# Patient Record
Sex: FEMALE | Race: WHITE | NOT HISPANIC OR LATINO | URBAN - METROPOLITAN AREA
[De-identification: names, ages, dates, MRNs, and addresses within clinical notes are randomized per-mention and may not be internally consistent; named-entity substitution may affect disease eponyms.]

---

## 2017-01-18 ENCOUNTER — TELEPHONE (OUTPATIENT)
Dept: TRANSPLANT | Facility: CLINIC | Age: 27
End: 2017-01-18

## 2017-01-18 NOTE — TELEPHONE ENCOUNTER
Vanessa was concerned with the info in packet about needing a PAP. She has never been and is not now sexually active. Mother had Cervical CA 20+ yrs ago but no other family hx. States if we tell her she needs a Pap she will withdraw from testing. Now presented at donor meeting and it was decided she was OK to not have one.Now spoke again with Vanessa and was very relieved. Will now do Phase 1 tests.

## 2017-01-19 ENCOUNTER — DOCUMENTATION ONLY (OUTPATIENT)
Dept: TRANSPLANT | Facility: CLINIC | Age: 27
End: 2017-01-19

## 2017-01-19 NOTE — PROGRESS NOTES
Per Vanessa's request, Phase 1 orders were faxed to [facility] Fax #[phone]

## 2017-02-06 ENCOUNTER — DOCUMENTATION ONLY (OUTPATIENT)
Dept: TRANSPLANT | Facility: CLINIC | Age: 27
End: 2017-02-06

## 2017-02-06 ENCOUNTER — TELEPHONE (OUTPATIENT)
Dept: TRANSPLANT | Facility: CLINIC | Age: 27
End: 2017-02-06

## 2017-02-07 ENCOUNTER — RESULTS ONLY (OUTPATIENT)
Dept: OTHER | Facility: CLINIC | Age: 27
End: 2017-02-07

## 2017-02-15 LAB
A* LOCUS: NORMAL
A*: NORMAL
ABTEST METHOD: NORMAL
B* LOCUS: NORMAL
B*: NORMAL
BW-1: NORMAL
BW-2: NORMAL
C* LOCUS: NORMAL
C*: NORMAL
DPA1*: NORMAL
DPB1*: NORMAL
DQA1*: NORMAL
DQA1*LOCUS: NORMAL
DQB1* LOCUS: NORMAL
DQB1*: NORMAL
DRB1* LOCUS: NORMAL
DRB3* LOCUS: NORMAL
DRB3*: NORMAL
DRSSO TEST METHOD: NORMAL

## 2017-02-27 ENCOUNTER — TELEPHONE (OUTPATIENT)
Dept: TRANSPLANT | Facility: CLINIC | Age: 27
End: 2017-02-27

## 2017-02-27 NOTE — TELEPHONE ENCOUNTER
Informed Vanessa of Virtual XM results-DSA. Cannot be direct donor. Multiple ?'s. Will now think over PEP and will get back to us.

## 2017-04-07 ENCOUNTER — TELEPHONE (OUTPATIENT)
Dept: TRANSPLANT | Facility: CLINIC | Age: 27
End: 2017-04-07

## 2017-04-07 DIAGNOSIS — Z00.5 TRANSPLANT DONOR EVALUATION: ICD-10-CM

## 2017-04-07 NOTE — TELEPHONE ENCOUNTER
Has decided she wants to do PEP. Is abo O. 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 Not happy that she needs to be here early in morning.Has CD. [place] not left [place] in past 3 months.

## 2017-05-05 ENCOUNTER — INFUSION THERAPY VISIT (OUTPATIENT)
Dept: INFUSION THERAPY | Facility: CLINIC | Age: 27
End: 2017-05-05
Attending: INTERNAL MEDICINE

## 2017-05-05 ENCOUNTER — OFFICE VISIT (OUTPATIENT)
Dept: NEPHROLOGY | Facility: CLINIC | Age: 27
End: 2017-05-05
Attending: TRANSPLANT SURGERY

## 2017-05-05 ENCOUNTER — OFFICE VISIT (OUTPATIENT)
Dept: TRANSPLANT | Facility: CLINIC | Age: 27
End: 2017-05-05
Attending: TRANSPLANT SURGERY

## 2017-05-05 ENCOUNTER — ALLIED HEALTH/NURSE VISIT (OUTPATIENT)
Dept: TRANSPLANT | Facility: CLINIC | Age: 27
End: 2017-05-05
Attending: TRANSPLANT SURGERY

## 2017-05-05 VITALS
BODY MASS INDEX: 25.91 KG/M2 | OXYGEN SATURATION: 99 % | WEIGHT: 165.1 LBS | TEMPERATURE: 98.3 F | DIASTOLIC BLOOD PRESSURE: 74 MMHG | HEIGHT: 67 IN | SYSTOLIC BLOOD PRESSURE: 118 MMHG | HEART RATE: 100 BPM | RESPIRATION RATE: 16 BRPM

## 2017-05-05 VITALS — DIASTOLIC BLOOD PRESSURE: 72 MMHG | SYSTOLIC BLOOD PRESSURE: 108 MMHG

## 2017-05-05 VITALS — DIASTOLIC BLOOD PRESSURE: 76 MMHG | SYSTOLIC BLOOD PRESSURE: 108 MMHG

## 2017-05-05 DIAGNOSIS — Z00.5 EXAMINATION OF POTENTIAL DONOR OF ORGAN AND TISSUE: Primary | ICD-10-CM

## 2017-05-05 DIAGNOSIS — Z00.5 TRANSPLANT DONOR EVALUATION: ICD-10-CM

## 2017-05-05 DIAGNOSIS — Z00.5 TRANSPLANT DONOR EVALUATION: Primary | ICD-10-CM

## 2017-05-05 LAB
ABO + RH BLD: NORMAL
ALBUMIN SERPL-MCNC: 4 G/DL (ref 3.4–5)
ALBUMIN UR-MCNC: NEGATIVE MG/DL
ALP SERPL-CCNC: 51 U/L (ref 40–150)
ALT SERPL W P-5'-P-CCNC: 20 U/L (ref 0–50)
ANION GAP SERPL CALCULATED.3IONS-SCNC: 8 MMOL/L (ref 3–14)
APPEARANCE UR: CLEAR
APTT PPP: 30 SEC (ref 22–37)
AST SERPL W P-5'-P-CCNC: 19 U/L (ref 0–45)
B-HCG SERPL-ACNC: <1 IU/L (ref 0–5)
BACTERIA #/AREA URNS HPF: ABNORMAL /HPF
BILIRUB SERPL-MCNC: 0.6 MG/DL (ref 0.2–1.3)
BILIRUB UR QL STRIP: NEGATIVE
BLD GP AB SCN SERPL QL: NORMAL
BLOOD BANK CMNT PATIENT-IMP: NORMAL
BUN SERPL-MCNC: 8 MG/DL (ref 7–30)
CALCIUM SERPL-MCNC: 8.4 MG/DL (ref 8.5–10.1)
CHLORIDE SERPL-SCNC: 106 MMOL/L (ref 94–109)
CHOLEST SERPL-MCNC: 164 MG/DL
CMV IGG SERPL QL IA: 0.3 AI (ref 0–0.8)
CO2 SERPL-SCNC: 26 MMOL/L (ref 20–32)
COLOR UR AUTO: ABNORMAL
CREAT SERPL-MCNC: 0.63 MG/DL (ref 0.52–1.04)
CREAT UR-MCNC: 24 MG/DL
EBV VCA IGG SER QL IA: NORMAL AI (ref 0–0.8)
EBV VCA IGM SER QL IA: 0.2 AI (ref 0–0.8)
ERYTHROCYTE [DISTWIDTH] IN BLOOD BY AUTOMATED COUNT: 11.2 % (ref 10–15)
GFR SERPL CREATININE-BSD FRML MDRD: ABNORMAL ML/MIN/1.7M2
GLUCOSE SERPL-MCNC: 83 MG/DL (ref 70–99)
GLUCOSE UR STRIP-MCNC: NEGATIVE MG/DL
HBA1C MFR BLD: 5.1 % (ref 4.3–6)
HBV CORE AB SERPL QL IA: NONREACTIVE
HBV SURFACE AB SERPL IA-ACNC: 273.65 M[IU]/ML
HBV SURFACE AG SERPL QL IA: NONREACTIVE
HCT VFR BLD AUTO: 41 % (ref 35–47)
HCV AB SERPL QL IA: NORMAL
HDLC SERPL-MCNC: 82 MG/DL
HGB BLD-MCNC: 14.1 G/DL (ref 11.7–15.7)
HGB UR QL STRIP: NEGATIVE
HIV 1+2 AB+HIV1 P24 AG SERPL QL IA: NORMAL
INR PPP: 1.04 (ref 0.86–1.14)
KETONES UR STRIP-MCNC: NEGATIVE MG/DL
LDLC SERPL CALC-MCNC: 76 MG/DL
LEUKOCYTE ESTERASE UR QL STRIP: NEGATIVE
MCH RBC QN AUTO: 30.9 PG (ref 26.5–33)
MCHC RBC AUTO-ENTMCNC: 34.4 G/DL (ref 31.5–36.5)
MCV RBC AUTO: 90 FL (ref 78–100)
MICROALBUMIN UR-MCNC: <5 MG/L
MICROALBUMIN/CREAT UR: NORMAL MG/G CR (ref 0–25)
NITRATE UR QL: NEGATIVE
NONHDLC SERPL-MCNC: 82 MG/DL
PH UR STRIP: 7 PH (ref 5–7)
PHOSPHATE SERPL-MCNC: 3 MG/DL (ref 2.5–4.5)
PLATELET # BLD AUTO: 250 10E9/L (ref 150–450)
POTASSIUM SERPL-SCNC: 4 MMOL/L (ref 3.4–5.3)
PROT SERPL-MCNC: 7.8 G/DL (ref 6.8–8.8)
PROT UR-MCNC: <0.05 G/L
PROT/CREAT 24H UR: NORMAL G/G CR (ref 0–0.2)
RBC # BLD AUTO: 4.56 10E12/L (ref 3.8–5.2)
RBC #/AREA URNS AUTO: 1 /HPF (ref 0–2)
SODIUM SERPL-SCNC: 140 MMOL/L (ref 133–144)
SP GR UR STRIP: 1 (ref 1–1.03)
SPECIMEN EXP DATE BLD: NORMAL
SPECIMEN EXP DATE BLD: NORMAL
SQUAMOUS #/AREA URNS AUTO: <1 /HPF (ref 0–1)
T PALLIDUM IGG+IGM SER QL: NEGATIVE
TRIGL SERPL-MCNC: 31 MG/DL
URATE SERPL-MCNC: 3.1 MG/DL (ref 2.6–6)
URN SPEC COLLECT METH UR: ABNORMAL
UROBILINOGEN UR STRIP-MCNC: 0 MG/DL (ref 0–2)
WBC # BLD AUTO: 4 10E9/L (ref 4–11)
WBC #/AREA URNS AUTO: <1 /HPF (ref 0–2)

## 2017-05-05 PROCEDURE — 85730 THROMBOPLASTIN TIME PARTIAL: CPT | Performed by: INTERNAL MEDICINE

## 2017-05-05 PROCEDURE — 25500064 ZZH RX 255 OP 636: Performed by: INTERNAL MEDICINE

## 2017-05-05 PROCEDURE — 80061 LIPID PANEL: CPT | Performed by: INTERNAL MEDICINE

## 2017-05-05 PROCEDURE — 84702 CHORIONIC GONADOTROPIN TEST: CPT | Performed by: INTERNAL MEDICINE

## 2017-05-05 PROCEDURE — 96374 THER/PROPH/DIAG INJ IV PUSH: CPT

## 2017-05-05 PROCEDURE — 84156 ASSAY OF PROTEIN URINE: CPT | Performed by: INTERNAL MEDICINE

## 2017-05-05 PROCEDURE — 83036 HEMOGLOBIN GLYCOSYLATED A1C: CPT | Performed by: INTERNAL MEDICINE

## 2017-05-05 PROCEDURE — 86704 HEP B CORE ANTIBODY TOTAL: CPT | Performed by: INTERNAL MEDICINE

## 2017-05-05 PROCEDURE — 84100 ASSAY OF PHOSPHORUS: CPT | Performed by: INTERNAL MEDICINE

## 2017-05-05 PROCEDURE — 86803 HEPATITIS C AB TEST: CPT | Performed by: INTERNAL MEDICINE

## 2017-05-05 PROCEDURE — 85027 COMPLETE CBC AUTOMATED: CPT | Performed by: INTERNAL MEDICINE

## 2017-05-05 PROCEDURE — 86901 BLOOD TYPING SEROLOGIC RH(D): CPT | Performed by: INTERNAL MEDICINE

## 2017-05-05 PROCEDURE — 86665 EPSTEIN-BARR CAPSID VCA: CPT | Performed by: INTERNAL MEDICINE

## 2017-05-05 PROCEDURE — 86706 HEP B SURFACE ANTIBODY: CPT | Performed by: INTERNAL MEDICINE

## 2017-05-05 PROCEDURE — 86665 EPSTEIN-BARR CAPSID VCA: CPT | Mod: 91 | Performed by: INTERNAL MEDICINE

## 2017-05-05 PROCEDURE — 40000866 ZZHCL STATISTIC HIV 1/2 ANTIGEN/ANTIBODY PRETRANSPLANT ONLY: Performed by: INTERNAL MEDICINE

## 2017-05-05 PROCEDURE — 86850 RBC ANTIBODY SCREEN: CPT | Performed by: INTERNAL MEDICINE

## 2017-05-05 PROCEDURE — 84550 ASSAY OF BLOOD/URIC ACID: CPT | Performed by: INTERNAL MEDICINE

## 2017-05-05 PROCEDURE — 86480 TB TEST CELL IMMUN MEASURE: CPT | Performed by: INTERNAL MEDICINE

## 2017-05-05 PROCEDURE — 82542 COL CHROMOTOGRAPHY QUAL/QUAN: CPT | Performed by: INTERNAL MEDICINE

## 2017-05-05 PROCEDURE — 82043 UR ALBUMIN QUANTITATIVE: CPT | Performed by: INTERNAL MEDICINE

## 2017-05-05 PROCEDURE — 86900 BLOOD TYPING SEROLOGIC ABO: CPT | Performed by: INTERNAL MEDICINE

## 2017-05-05 PROCEDURE — 86644 CMV ANTIBODY: CPT | Performed by: INTERNAL MEDICINE

## 2017-05-05 PROCEDURE — 87340 HEPATITIS B SURFACE AG IA: CPT | Performed by: INTERNAL MEDICINE

## 2017-05-05 PROCEDURE — 86780 TREPONEMA PALLIDUM: CPT | Performed by: INTERNAL MEDICINE

## 2017-05-05 PROCEDURE — 36415 COLL VENOUS BLD VENIPUNCTURE: CPT | Performed by: INTERNAL MEDICINE

## 2017-05-05 PROCEDURE — 81001 URINALYSIS AUTO W/SCOPE: CPT | Performed by: INTERNAL MEDICINE

## 2017-05-05 PROCEDURE — 85610 PROTHROMBIN TIME: CPT | Performed by: INTERNAL MEDICINE

## 2017-05-05 PROCEDURE — 80053 COMPREHEN METABOLIC PANEL: CPT | Performed by: INTERNAL MEDICINE

## 2017-05-05 RX ADMIN — IOHEXOL 5 ML: 300 INJECTION, SOLUTION INTRAVENOUS at 09:12

## 2017-05-05 ASSESSMENT — PAIN SCALES - GENERAL: PAINLEVEL: NO PAIN (0)

## 2017-05-05 NOTE — PROGRESS NOTES
Donor Iohexol test    Vanessa Larsen presents today to [facility] for a Donor Iohexol test.      Progress note:  ID verified by name and .     The following information was verified with the patient:  Female Patients is there any possibility of being pregnant No  Is there a history of allergy (skin rash, swelling, ect) to:   A.  Iodine (except skin reactions to betadine): No   B. Intravenous radio-contrast agents: No   C. Seafood No    R.N. provided patient with educational handout regarding timed test. No    20 gauge PIV placed in R AC.  Iohexol administered.  Following iohexol administration extension set replaced.  PIV left in place for blood draws and CT this afternoon    Medication administered:  Iohexol (Omnipaque 300mg iodine/ml concentration) 5 mls.    Start time: 912  Stop time: 914    Evaluation nurse in transplant to draw labs at 2 and 4 hours post iohexol administration.  Patient given a slip with the times to get labs drawn and verbalized understanding of the plan.    Patient tolerated the procedure:  Yes    After the infusion patient was discharged to the next appointment.

## 2017-05-05 NOTE — LETTER
5/5/2017       RE: Vanessa Larsen  [address] [address] [address]     Dear Colleague,    Thank you for referring your patient, Vanessa Larsen, to the [facility] SOLID ORGAN TRANSPLANT at [facility]. Please see a copy of my visit note below.    Patient attended all appointments and completed all scheduled tests.  Patient to follow up with Transplant Coordinator.  Patient stated understanding and has contact numbers.      Again, thank you for allowing me to participate in the care of your patient.      Sincerely,    Transplant Evaluation Resource

## 2017-05-05 NOTE — MR AVS SNAPSHOT
After Visit Summary   5/5/2017    Vanessa Larsen    MRN: [id number]           Patient Information     Date Of Birth          1990        Visit Information        Provider Department      5/5/2017 1:00 PM Mayra Najera MD [facility] Nephrology        Today's Diagnoses     Transplant donor evaluation           Follow-ups after your visit        Who to contact     If you have questions or need follow up information about today's clinic visit or your schedule please contact [facility] NEPHROLOGY directly at [phone].  Normal or non-critical lab and imaging results will be communicated to you by SugarCRMhart, letter or phone within 4 business days after the clinic has received the results. If you do not hear from us within 7 days, please contact the clinic through SugarCRMhart or phone. If you have a critical or abnormal lab result, we will notify you by phone as soon as possible.  Submit refill requests through Dizzion or call your pharmacy and they will forward the refill request to us. Please allow 3 business days for your refill to be completed.          Additional Information About Your Visit        MyChart Information     Dizzion gives you secure access to your electronic health record. If you see a primary care provider, you can also send messages to your care team and make appointments. If you have questions, please call your primary care clinic.  If you do not have a primary care provider, please call [phone] and they will assist you.        Care EveryWhere ID     This is your Care EveryWhere ID. This could be used by other organizations to access your [place] medical records  [id number]         Blood Pressure from Last 3 Encounters:   No data found for BP    Weight from Last 3 Encounters:   No data found for Wt              Today, you had the following     No orders found for display       Primary Care Provider Office Phone # Fax #    Edelmira Nance [phone]        [facility] [address] [facility] [address]        Thank you!     Thank you for choosing [facility] NEPHROLOGY  for your care. Our goal is always to provide you with excellent care. Hearing back from our patients is one way we can continue to improve our services. Please take a few minutes to complete the written survey that you may receive in the mail after your visit with us. Thank you!             Your Updated Medication List - Protect others around you: Learn how to safely use, store and throw away your medicines at www.disposemymeds.org.      Notice  As of 5/5/2017 11:59 PM    You have not been prescribed any medications.

## 2017-05-05 NOTE — MR AVS SNAPSHOT
"              After Visit Summary   2017    Vanessa Larsen    MRN: [id number]           Patient Information     Date Of Birth          1990        Visit Information        Provider Department      2017 12:30 PM Mary Gutierrez S.A., RN [facility] Solid Organ Transplant        Today's Diagnoses     Examination of potential donor of organ and tissue    -  1       Follow-ups after your visit        Who to contact     If you have questions or need follow up information about today's clinic visit or your schedule please contact [facility] SOLID ORGAN TRANSPLANT directly at [phone].  Normal or non-critical lab and imaging results will be communicated to you by Printihart, letter or phone within 4 business days after the clinic has received the results. If you do not hear from us within 7 days, please contact the clinic through Printihart or phone. If you have a critical or abnormal lab result, we will notify you by phone as soon as possible.  Submit refill requests through Vivid Logic or call your pharmacy and they will forward the refill request to us. Please allow 3 business days for your refill to be completed.          Additional Information About Your Visit        MyChart Information     Vivid Logic lets you send messages to your doctor, view your test results, renew your prescriptions, schedule appointments and more. To sign up, go to [email or web address] . Click on \"Log in\" on the left side of the screen, which will take you to the Welcome page. Then click on \"Sign up Now\" on the right side of the page.     You will be asked to enter the access code listed below, as well as some personal information. Please follow the directions to create your username and password.     Your access code is: [email or web address]  Expires: 2017  6:31 AM     Your access code will  in 90 days. If you need help or a new code, please call your [place] clinic or [phone].        Care EveryWhere ID     This is your Care EveryWhere " ID. This could be used by other organizations to access your [place] medical records  [id number]         Blood Pressure from Last 3 Encounters:   05/05/17 108/72   05/05/17 108/76   05/05/17 118/74    Weight from Last 3 Encounters:   05/05/17 74.9 kg (165 lb 1.6 oz)              Today, you had the following     No orders found for display       Primary Care Provider Office Phone # Fax #    Edelmira FLOYD Nance [phone]       [place] [address] [address] [address]        Thank you!     Thank you for choosing [facility] SOLID ORGAN TRANSPLANT  for your care. Our goal is always to provide you with excellent care. Hearing back from our patients is one way we can continue to improve our services. Please take a few minutes to complete the written survey that you may receive in the mail after your visit with us. Thank you!             Your Updated Medication List - Protect others around you: Learn how to safely use, store and throw away your medicines at www.disposemymeds.org.      Notice  As of 5/5/2017  3:57 PM    You have not been prescribed any medications.

## 2017-05-05 NOTE — PROGRESS NOTES
Psychosocial Evaluation  Living Organ Donation per OPTN Policy 14.1.A  Organ Type: unrelated, kidney  Presenting Information:  Ms. Vanessa Larsen presents to the [facility] to complete a psychosocial evaluation since she is interested in becoming a kidney donor to her aunt, through marriage, Ms. Julia Helton, age 49.  Ms. Larsen is a 27 year old, single, white, American, female.  She is in clinic today with her mother, but she was evaluated independently.    PERSONAL BACKGROUND:  Current Living Situation: Ms. Larsen lives with her mother in [place], [place] which is a 3 1/2 to 4 hour drive from the [facility].    Education/Employment/Financial Situation: Ms. Larsen completed a bachelor's degree in elementary education.  She is just completing her first year as a  in the [place], [place] school district, which is about a 25 minute drive from her home in [place], [place].  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  This would lessen the financial impact of donation.  She has health care insurance though her job at the school district.  We did discuss some options, including NLDAC, for her to help fund her donor travel expenses.  Currently, she is staying overnight with a brother who lives locally, but that brother is likely moving out of the area soon, so she would need some assistance for hotel if she were approved for surgery.    Family History: Ms. Larsen is single, never , and does not have any children.  She lives with her mother.  Her parents are , and her father lives in town.  She sees him weekly.  She has 4 brother and 1 sister.  The two oldest brother and the 1 older sister are 1/2 siblings, and the 2 younger brothers are full siblings.  She reports a strong family history of alcohol and drug abuse/dependency with her mom  "and some of her siblings.       General Health: Ms. Larsen reports that she is in excellent health.  She has only had one surgery for wisdom tooth extraction.  She exercises only in the nice weather.  She likes to do rollerblading.    Mental Health: Denies any past or present treatment for mental health issues.  Denies any need to see a counselor or therapist.  Denies any past suicidal ideation, plans, or past attempts.  Denies any use of psychotropic medications.  Denies any past history of hospitalization for psychiatric illness.    Alcohol and Drug Use/Abuse/Dependency: Denies any past history of abuse or dependency on alcohol or illicit drugs. Denies any current use of street drugs, including marijuana.  Denies any past history of negative consequences of use of alcohol or drugs such as a DUI.  Ms. Larsen reports that she does not use any substances and never has.  She reports that she feels that she has a rather \"addictive personality\" and can get hooked on things easily, such as video games, so she does not keep video games in her house.  She reports a strong family history of addiction of alcohol and drugs in her family:  Mother and some siblings.  Because of this, she chooses not to use any substances.  Ms. Larsen does have some concerns about pain medications needed post surgery and what can be done to minimize use of opoid medications.  I asked her to speak with the surgeon about this issue.  I then spoke to Dr. Lange ahead of the patient's appointment with her today to let her know of Mr. Larsen's concerns.           Cigarette Use: Ms. Larsen does not smoke cigarettes and never has.    Legal: Ms. Larsen denies any legal issues.    Coping Strategies/Support System: Ms. Larsne's mother, with whom she lives, would be willing and able to care for her post surgery.  Ms. Larsen enjoys gardening and roller-blading for her hobbies.    DONOR SPECIFIC INFORMATION:  Relationship to Recipient: Ms. Larsen wants to " donate a kidney to her aunt through marriage, Ms. Julia Helton, age 49.  Ms. Helton is her mother's sister-in-law.  They are quite close to her, even through she is  from Ms. Larsen's uncle, who is the biological brother of Ms. Larsen's mom.  Ms. Larsen does not know the details of Ms. Helton's disease, but does tell me that this will be her first transplant.      Decision Process/Motivation to Donate: Ms. Larsen states that she is motivated to help her aunt out of concern for her and her desire to see her live a long, healthier life.  She states that her aunt is a terrific person who has raised 3 biological children and 2 adopted children.  Ms. Larsen is close to these cousins.  Ms. Larsen denies any pressure or coercion to donate.  She denies any offer of payment to donate.    PREPARATION FOR DONATION, RECOVERY, AND POTENTIAL SHORT-LONG-TERM OUTCOMES:  Understanding of the Living Donation Process:   We discussed the role of the donor  and Independent Donor Advocate.  Short and long term medical and psychosocial risks to both, donor and recipient were reviewed and she appears to understand these risks.  High risk behaviors as defined by [place] Public Health Services (PHS) 2013 that have potential to increase risk of disease transmission were reviewed and she denies any high risk behaviors. Post surgical restrictions (2 weeks no driving, 6 weeks no lifting over 10 lbs) were reviewed and she appears capable of adhering to the post surgical requirements. The need for a caregiver was discussed and she had adequate social support .  The risk of poor psychosocial outcome including problems with body image, post-surgery depression or anxiety, or feelings of emotional distress or bereavement if recipient experiences any recurrent disease, poor outcome or death was reviewed.  Additionally, potential financial implications, including the risk of having difficulty obtaining health care insurance,  "life insurance, disability insurance, or long term care insurance were reviewed, as were available donor grants to assist with donor related expenses.      We also discussed some unique issues that arise with paired kidney donation, which include the uncertainty of the timing and the importance of having a employment situation and support system that is able to provide sustained support and flexibility.    Ms. Larsen appears capable of understanding this information and making an informed medical decision.    Impressions/Recommendations:   Ms. Vanessa Larsen  is highly motivated to donate kidney  to Ms. Julia Helton, age 49, who is her aunt though marriage (her mother's sister-in-law).  Her decision to donate is free of inducement, coercion, or other undue pressure.   Her housing, finances and employment are stable.  No current/active mental health or chemical abuse issues were identified.  However, we did discuss the history of illicit drug and alcohol abuse in her biological family members, and her concerns about her own potential for addiction.  She will need proper counseling from the surgeon to educate about options for post surgical pain control to avoid or curtail potentially addictive substances.  To be clear, Ms. Larsen does not use any substances (alcohol or illicit drugs) and never has.  She is looking out for herself due to her family's addictive behaviors and her own personality traits to be somewhat \"addictive/obsessive\".  The need for a caregiver was reviewed and she is able to identify a plan to meet her post operative care needs.  Ms. Larsen appears capable of making an informed medical decision.  No psychosocial contraindications to living organ donation were identified and  I support Ms. Larsen s desire to donate a kidney to Ms. Julia Sunitha.       Contact Information:   BARBER Espino, [facility]  Clinical  and Independent Donor Advocate  [facility] " - Transplant Center  Pager:  [phone]  Direct:  [phone]    Time Spent: 60 minutes      Living Kidney Donor Consent per OPTN Policy 14.3.A for Independent Living Donor Advocate (MICKI)    Written assurance has been obtained from the potential donor that he/she:   Is willing to donate  Is free from inducement and coercion  Has been informed that the he/she may decline to donate at any time  Has been informed that transplant centers must:   A) Offer donors an opportunity to discontinue the donor consent or evaluation process in a way that is protected and confidential  B) Provide an independent living donor advocate (MICKI) to assist the potential donor during this process    The following was presented to the potential donor in a language in which the potential donor is able to engage in meaningful dialogue:   Education and instruction about all phases of the living donation process including:   Consent  Medical and psychosocial evaluations  Pre and post operative care  Required post operative follow up  Disclosure that the [facility] hospital will take all reasonable precautions to provide confidentiality for the donor/recipient  Disclosure that it is a federal crime for any person to knowingly acquire, obtain or otherwise transfer any human organ for valuable consideration  Disclosure that the [facility] hospital must provide an independent living donor advocate (MICKI)  Disclosure that health information obtained during the evaluation is subject to the same regulations as all records and could reveal conditions that must be reported to local, state, or federal public health authorities  Disclosure that the [facility] hospital is required to report living donor follow up information at 6 months, 1 year, and 2 years, and that the potential donor must commit to post operative follow up testing coordinated by the [facility] hospital    Disclosure has been provided that these risks may be transient or permanent & include  but are not limited to:  Potential psychosocial risks:  Problems with body image  Post-surgery depression or anxiety  Feelings of emotional distress or bereavement if recipient experiences any recurrent disease or in the event of the recipient s death  Impact of donation on the donor s lifestyle    Potential financial impacts:  Personal expenses of travel, housing, , lost wages related to donation might not be reimbursed. However, resources may be available to defray some donation-related expenses   Need for life-long follow up at the donor s expense  Loss of employment or income  Negative impact on the ability to obtain future employment  Negative impact on the ability to obtain, maintain, or afford health, disability, and life insurance  Future health problems experienced by living donors following donation may not be covered by the recipient s insurance    Contact Information:  BARBER Espino, [facility]  Clinical  and Independent Donor Advocate  [facility] - Transplant Center  Pager:  [phone]  Direct:  [phone]      Time Spent: 60 minutes

## 2017-05-05 NOTE — MR AVS SNAPSHOT
"              After Visit Summary   2017    Vanessa Larsen    MRN: [id number]           Patient Information     Date Of Birth          1990        Visit Information        Provider Department      2017 8:00 AM [facility] EVALUATION [facility] Solid Organ Transplant        Today's Diagnoses     Transplant donor evaluation    -  1       Follow-ups after your visit        Who to contact     If you have questions or need follow up information about today's clinic visit or your schedule please contact [facility] SOLID ORGAN TRANSPLANT directly at [phone].  Normal or non-critical lab and imaging results will be communicated to you by MyChart, letter or phone within 4 business days after the clinic has received the results. If you do not hear from us within 7 days, please contact the clinic through LivingWell Healthhart or phone. If you have a critical or abnormal lab result, we will notify you by phone as soon as possible.  Submit refill requests through Bozuko or call your pharmacy and they will forward the refill request to us. Please allow 3 business days for your refill to be completed.          Additional Information About Your Visit        MyChart Information     Bozuko lets you send messages to your doctor, view your test results, renew your prescriptions, schedule appointments and more. To sign up, go to [email or web address] . Click on \"Log in\" on the left side of the screen, which will take you to the Welcome page. Then click on \"Sign up Now\" on the right side of the page.     You will be asked to enter the access code listed below, as well as some personal information. Please follow the directions to create your username and password.     Your access code is: [email or web address]  Expires: 2017  6:31 AM     Your access code will  in 90 days. If you need help or a new code, please call your [place] clinic or [phone].        Care EveryWhere ID     This is your Care EveryWhere ID. This could be used by " "other organizations to access your [place] medical records  [id number]        Your Vitals Were     Pulse Temperature Respirations Height Pulse Oximetry BMI (Body Mass Index)    100 98.3  F (36.8  C) (Oral) 16 1.702 m (5' 7\") 99% 25.86 kg/m2       Blood Pressure from Last 3 Encounters:   05/05/17 108/72   05/05/17 108/76   05/05/17 118/74    Weight from Last 3 Encounters:   05/05/17 74.9 kg (165 lb 1.6 oz)              Today, you had the following     No orders found for display       Primary Care Provider Office Phone # Fax #    Edelmira FLOYD Nance [phone]       [address] [address]        Thank you!     Thank you for choosing [facility] SOLID ORGAN TRANSPLANT  for your care. Our goal is always to provide you with excellent care. Hearing back from our patients is one way we can continue to improve our services. Please take a few minutes to complete the written survey that you may receive in the mail after your visit with us. Thank you!             Your Updated Medication List - Protect others around you: Learn how to safely use, store and throw away your medicines at www.disposemymeds.org.      Notice  As of 5/5/2017 11:59 PM    You have not been prescribed any medications.      "

## 2017-05-05 NOTE — PROGRESS NOTES
Saw Vanessa Larsen in clinic during kidney donor evaluation.  Has offered to be donor to Paired exchange program for Julia Helton.    They are incompatible by DSA.      Discussed surgery hospitalization and recovery.  Knows when and how to obtain evaluation results and the process for scheduling surgery.  Has received and reviewed the donor education materials including donor DVD.  Signed consent for donor evaluation.  Reviewed SRTR Datasheet.  Requested routine cancer screening tests: n/a.  The patient is not sexually active and has not required pap testing.    Time spent 20 minutes.    The patient lives in [place], she is a teacher.  Her mother Maria A came with her today.  Timing for surgery is preferred in summer.      I reviewed details pertaining to the Paired Exchange programs.       1. National Kidney Registry    2. [place] for Paired donation    3. [facility] D Program    4. Internal Exchange at the [facility]    Matching Procedure and Logistics    Reviewed that donors and candidates do not choose their match and that they may decline a match. Explained examples of potential chain/swap scenarios and demonstrated how more than one candidate can receive a transplant in these exchanges.   Reviewed that the KPD waiting time is unknown, the intended recipient's antibody panel and reviewed their ABO match power.   Frequent lab draws are necessary in the KPD programs. APD and NKR will send a kit when we initiate the listing to store the blood by freezing the sample and use it for exploratory crossmatches. Once a match offer is made, a fresh blood sample will be needed for the final confirmatory compatibility testing, as well as infectious disease testing.  I reviewed billing policy for the donor evaluation and for the KPD program.  Bridging  KPD programs may need to have the donor wait a period of time after the recipient receives their kidney, in order to determine matching and logistics  for the next cluster of a chain. If donor consents to be a bridge donor, the time waiting for the swap cluster to take place is unknown. The donor stated that she would like to consider her decision about being a bridge donor if needed. Further blood work may be needed if they are waiting to bridge one year after the initial evaluation.  Unexpected Outcome  I discussed possibility of an unexpected event on the day of transplant. I explained how their donated kidney would be backed up here locally as well as in the destination city in the event that the matched recipient is unable to receive the donated kidney.   I reviewed the KPD programs remedy for failed KPD exchanges, and the remedy does not include any additional priority for the paired candidate on the  donor waiting list. The outcome of the matched recipient may be different from the intended recipient's outcome.  Shipping  The kidney's are transported to the matched recipient's in the exchanges via an approved  service to the airport and then flown fixed wing to the intended destination.GPS devices are used in all the NKR exchanges for close monitoring. Risk included in shipping include damage or loss related to unforseen situations; car crash, airplane crash, terrorist events, etc.   Communication  Donors can communicate with the recipient by giving all correspondence to the Transplant Coordinator, omitting all personal identifiers. The Transplant Coordinator will review and deliver to the recipients Coordinator.  Rights  Donors have a right to withdraw from participation in the KPD program at any time.     Donor has signed consent for:  *Release of Information form used for sharing evaluation clinical data to recipient transplant center.  She has taken the main KPD consent forms home with her to read and she will sign and send back when completed.     Questions answered.      Living Kidney Donor Consent per OPTN Policy for Transplant  Coordinators     Written assurance has been obtained from the patient that the donor:   1) Is willing to donate  2) Is free from inducement and coercion  3) Has been informed that the donor may decline to donate at any time  4) Has been informed that transplant centers must:   A) Offer donors an opportunity to discontinue the donor consent or evaluation process in a way that is protected and confidential  B) Provide an independent donor advocate (MYRON) to assist the potential donor during this process     The following was presented in a language in which donor is able to engage in meaningful dialogue and was reviewed and discussed with the patient by the transplant coordinator and the physician.      The following has been provided:   Education and instruction about all phases of the living donation process includin) Consent  2) The donor must undergo medical and psychosocial evaluations  3) Disclosure that the recovery hospital will take all reasonable precautions to provide confidentiality for the donor/recipient  4) Disclosure that it is a federal crime for any person to knowingly acquire, obtain or otherwise transfer any human organ for valuable consideration  5) The transplant hospital may refuse the potential donor, and the donor could be evaluated by another transplant program with different selection criteria  6) Data from the most recent SRTR center-specific reports:   A) National 1-year patient and graft survival rates  B) Hospital s 1-year patient and graft survival rates  C) Notification about all CMS outcome requirements not being met by the recovery and recipient s hospitals     The following has been provided:   1) Education about expected post-donation kidney function and how chronic kidney disease and end-stage renal disease might potentially impact the donor in the future to include:  A) On average, donors will have 25-35% permanent loss of kidney function at donation  B) Baseline risk of End  Stage Renal Disease (ESRD) does not exceed that of members of general population with same demographic profile  C) Donor risks must be interpreted in light of known epidemiology of both Chronic Kidney Disease (CKD) or ESRD  D) CKD generally develops in midlife (40-50 years old)  E) ESRD generally develops after age 60  F) Medical evaluation of young potential donor cannot predict lifetime risk  2) Donors may be at higher risk for CKD if they sustain damage to the remaining kidney. 3) Development of CKD and progression to ESRD may be more rapid with only 1 kidney  4) Dialysis is required when reaching ESRD  5) Current practice prioritizes prior living kidney donors who became kidney transplant candidates  6) Alternate procedures or courses of treatment for the recipient, including  donor transplant  A) A  donor kidney may become available for the recipient before donor evaluation is complete or transplant occurs  B) Any transplant candidate may have risk factors for increased morbidity or mortality that are not disclosed to the potential donor  7) The patient will receive a thorough medical and psychosocial evaluation  8) Health information obtained during the evaluation is subject to the same regulations as all records and could reveal conditions that must be reported to local, state, or federal public health authorities  9) The [facility] [place], is required to report living donor follow up information at 6, 12, and 24 months  10) Potential donors must commit to post operative follow up testing coordinated by the [facility]  11) Any infectious disease or malignancy pertinent to acute recipient care discovered during the potential donor s first two years of follow up care:  A) Will be disclosed to the donor  B) May need to be reported to local, state or federal public health authorities  C) Will be disclosed to their recipient s transplant center, and  D) Will be reported  through the OPTN Improving Patient Safety Portal     Disclosure has been provided that these risks may be transient or permanent & include but are not limited to potential medical or surgical risks:  Death  Scars, pain, fatigue, and other consequences typical of any surgical procedure  Decreased kidney function  Abdominal or bowel symptoms such as bloating and nausea, and developing bowel obstruction  Kidney failure and the need for dialysis or kidney transplant for the donor  Impact of obesity, hypertension or other donor-specific medical conditions on morbidity and mortality of the potential donor.    Questions answered.  Mary Gutierrez RN  Living Donor Coordinator  05/05/2017 3:43 PM

## 2017-05-05 NOTE — MR AVS SNAPSHOT
After Visit Summary   5/5/2017    Vanessa Larsen    MRN: [id number]           Patient Information     Date Of Birth          1990        Visit Information        Provider Department      5/5/2017 12:00 PM Etta White RD [facility]        Today's Diagnoses     Transplant donor evaluation           Follow-ups after your visit        Your next 10 appointments already scheduled     May 05, 2017 12:00 PM CDT   NUTRITION VISIT with Etta White RD   [facility] ([facility])    [address] [place] [id number]   [phone]            May 05, 2017 12:30 PM CDT   (Arrive by 12:15 PM)   SOT CARE COORDINATOR EVAL with Mary Gutierrez RN   [facility] ([facility])    [address] [place] [id number]   [phone]            May 05, 2017  1:00 PM CDT   (Arrive by 12:30 PM)   Kidney Donor Evaluation with Mayra Najera MD   [facility] Nephrology ([facility])    [address] [place] [id number]   [phone]            May 05, 2017  2:00 PM CDT   (Arrive by 1:45 PM)   CT RENAL ANGIO with [email or web address]   [facility] CT ([facility])    [address] [place] [id number]   [phone]           Please bring any scans or X-rays taken at other hospitals, if similar tests were done. Also bring a list of your medicines, including vitamins, minerals and over-the-counter drugs. It is safest to leave personal items at home.  Be sure to tell your doctor:   If you have any allergies.   If there s any chance you are pregnant.   If you are breastfeeding.   If you have any special needs.  You will have contrast for this exam. To prepare:   Do not eat or drink for 2 hours before your exam. If you need to take medicine, you may take it  with small sips of water. (We may ask you to take liquid medicine as well.)   The day before your exam, drink extra fluids at least six 8-ounce glasses (unless your doctor tells you to restrict your fluids).  Patients over 70 or patients with diabetes or kidney problems:   If you haven t had a blood test (creatinine test) within the last 30 days, go to your clinic or Diagnostic Imaging Department for this test.  If you have diabetes:   If your kidney function is normal, continue taking your metformin (Avandamet, Glucophage, Glucovance, Metaglip) on the day of your exam.   If your kidney function is abnormal, wait 48 hours before restarting this medicine.  Please wear loose clothing, such as a sweat suit or jogging clothes. Avoid snaps, zippers and other metal. We may ask you to undress and put on a hospital gown.  If you have any questions, please call the Imaging Department where you will have your exam.            May 05, 2017  2:20 PM CDT   (Arrive by 2:05 PM)   XR CHEST 2 VIEWS with [email or web address]   [facility] ([facility])    [address] [address] [phone]           Please bring a list of your current medicines to your exam. (Include vitamins, minerals and over-thecounter medicines.) Leave your valuables at home.  Tell your doctor if there is a chance you may be pregnant.  You do not need to do anything special for this exam.              Who to contact     If you have questions or need follow up information about today's clinic visit or your schedule please contact [facility] directly at [phone].  Normal or non-critical lab and imaging results will be communicated to you by MyChart, letter or phone within 4 business days after the clinic has received the results. If you do not hear from us within 7 days, please contact the clinic through MyChart or phone. If you have a critical or abnormal lab result, we will  "notify you by phone as soon as possible.  Submit refill requests through [employer] or call your pharmacy and they will forward the refill request to us. Please allow 3 business days for your refill to be completed.          Additional Information About Your Visit        [facility] Information     [employer] lets you send messages to your doctor, view your test results, renew your prescriptions, schedule appointments and more. To sign up, go to [email or web address] . Click on \"Log in\" on the left side of the screen, which will take you to the Welcome page. Then click on \"Sign up Now\" on the right side of the page.     You will be asked to enter the access code listed below, as well as some personal information. Please follow the directions to create your username and password.     Your access code is: [email or web address]  Expires: 2017  6:31 AM     Your access code will  in 90 days. If you need help or a new code, please call your [facility] clinic or [phone].        Care EveryWhere ID     This is your Care EveryWhere ID. This could be used by other organizations to access your [facility] medical records  [id number]         Blood Pressure from Last 3 Encounters:   17 118/74    Weight from Last 3 Encounters:   17 74.9 kg (165 lb 1.6 oz)              We Performed the Following     NUTRITION REFERRAL        Primary Care Provider Office Phone # Fax #    Edelmira Nance, [place] [phone]       [address] [address]        Thank you!     Thank you for choosing [facility]  for your care. Our goal is always to provide you with excellent care. Hearing back from our patients is one way we can continue to improve our services. Please take a few minutes to complete the written survey that you may receive in the mail after your visit with us. Thank you!             Your Updated Medication List - Protect others around you: Learn how to safely use, store " and throw away your medicines at www.disposemymeds.org.      Notice  As of 5/5/2017 11:38 AM    You have not been prescribed any medications.

## 2017-05-05 NOTE — LETTER
5/5/2017      RE: Vanessa Larsen  [id number] [facility] [id number]       Assessment and Plan:  1. Prospective kidney transplant donor with no issues that need to be addressed prior to donation. Patient's blood pressure is acceptable at this visit, kidney function appears to be acceptable with Iohexol pending, and urinalysis is bland.    Discussed the risks of donating a kidney, including the surgical risk and the possible risks of living with one kidney.    Education about expected post-donation kidney function and how chronic kidney disease (CKD) and end stage kidney disease (ESKD) might potentially impact the donor in the future, include, but not limited to:       - On average, donors will have 25-35% permanent loss of kidney function at donation.       - Baseline risk of ESKD may slightly exceed that of members of the general population with the same demographic profile.       - Donor risks must be interpreted in light of known epidemiology of both CKD or ESKD, such as that CKD generally develops in midlife (40-50 years old) and ESKD generally develops after age 60.       - Medical evaluation of young potential donors cannot predict lifetime risk of CKD or ESKD.       - Donors may be at higher risk for CKD if they sustain damage to the remaining kidney.       - Development of CKD and progression of ESKD may be more rapid with only 1 kidney.       - Some type of kidney replacement therapy, either kidney transplant or dialysis, is required when reaching ESKD.    Potential medical or surgical risks include, but not limited to:       - Death.       - Scars, pain, fatigue, and other consequences typical of any surgical procedure.       - Decreased kidney function.       - Abdominal or bowel symptoms, such as bloating and nausea, and developing bowel obstruction.       - Kidney failure (ESKD) and the need for a kidney transplant or dialysis for the donor.       - Impact of obesity, hypertension, or other  donor-specific medical conditions on morbidity and mortality of the potential donor.    Patients overall evaluation will be discussed with the transplant team and a final recommendation on the patients' suitability to be a kidney transplant donor will be made at that time.    Consult:  Vanessa Larsen was seen in consultation at the request of Dr. Silviano Lange for evaluation as a potential kidney transplant donor.    Reason for Visit:  Vanessa Larsen is a 27 year old female who presents for a kidney donor evaluation.  Patient would like to donate to her aunt.    HPI:    Patient is a 28 yo  F., in very good health, who wants to donate to her aunt, as they are very close, and she would like to help her. She feels she is the only one who can do this, as her schedule allowed her to take days off, and her work dose not involve physical activity, so she can go back to work quiqe. She dose not exercise regular, but she dose not have any cardiac symptoms, and can walks for miles.Dose not check her BP at home.  Her only medical complain is occasionally headache that is relived with Ibuprofen. She dose not take it more then 4 times a mo.She likes to spend time outside, and she has intense exposure to sun, but she never have a skin cancer, is following with dermatology.   She never had a pap smear as she is virgin.          Kidney Disease Hx:       h/o Kidney Problems: No  Family h/o Genetic Kidney Disease: No       h/o HTN: No    Usual BP: Not checked       h/o Protein in Urine: No  h/o Blood in Urine: No       h/o Kidney Stones: No  h/o Kidney Injury: No       h/o Recurrent UTI: No  h/o Genitourinary Problems: No       h/o Chronic NSAID Use: Yes          Other Medical Hx:       h/o DM: No   h/o Gestational DM: Not applicable       h/o Preeclampsia: Not applicable          h/o Gastrointestinal, Pancreas or Liver Problems: No       h/o Lung or Heart Problems: No       h/o Hematologic Problems: No  h/o Bleeding or  Clotting Problems: No       h/o Cancer: no       h/o Infection Problems: No         Skin Cancer Risk:       h/o more than 50 moles: No       h/o extensive sun exposure: Yes        h/o melanoma: No       Family h/o melanoma: No         Mental Health Assessment:       h/o Depression: No       h/o Psychiatric Illness: No       h/o Suicidal Attempt(s): No    ROS:   A comprehensive review of systems was obtained and negative, except as noted in the HPI or PMH.    PMH:   History was taken from the patient as noted below.  Past Medical History:   Diagnosis Date     Seasonal allergies        PSH:   Past Surgical History:   Procedure Laterality Date     HC TOOTH EXTRACTION W/FORCEP       Personal or family history of anesthesia problems: No    Family Hx:   Family History   Problem Relation Age of Onset     Arthritis Mother      Arthritis Father      Pacemaker Father      Breast Cancer Maternal Grandmother      Breast Cancer Other           Specific Family Hx:       FH of DM: No       FH of CAD: Yes father with stents     FH of HTN: Yes, mother         FH of Cancer: Yes, non-melanoma skin cancer    FH of Kidney Cancer: NO     Personal Hx:   Social History     Social History     Marital status: Single     Spouse name: N/A     Number of children: 0     Years of education: 18     Occupational History     teacher      [employer]     Social History Main Topics     Smoking status: Never Smoker     Smokeless tobacco: Not on file     Alcohol use No     Drug use: No     Sexual activity: No     Other Topics Concern     Not on file     Social History Narrative          Specific Social Hx:       Health Insurance Status: Yes       Employment Status: Full time  Occupation: teacher                        Living Arrangements: lives with an adult        Social Support: Yes       Presence of increased risk for disease transmission behaviors as defined by PHS guidelines: No        Allergies:  Allergies   Allergen  "Reactions     Erythromycin        Medications:  Prior to Admission medications    Not on File       Vitals:  Vital Signs 5/5/2017 5/5/2017 5/5/2017   Systolic 118 108 108   Diastolic 74 76 72   Pulse 100 - -   Temperature 98.3 - -   Respirations 16 - -   Weight (LB) 165 lb 1.6 oz - -   Height 5' 7\" - -   BMI (Calculated) 25.91 - -   Pain - - -   O2 99 - -       Exam:   GENERAL APPEARANCE: alert and no distress  HENT: mouth without ulcers or lesions  LYMPHATICS: no cervical or supraclavicular nodes  RESP: lungs clear to auscultation - no rales, rhonchi or wheezes  CV: regular rhythm, normal rate, no rub, no murmur  EDEMA: no LE edema bilaterally  ABDOMEN: soft, nondistended, nontender, bowel sounds normal  MS: extremities normal - no gross deformities noted, no evidence of inflammation in joints, no muscle tenderness  SKIN: no rash    Results:   Labs and imaging were ordered for this visit and reviewed by me.  Recent Results (from the past 336 hour(s))   ABO/Rh type and screen    Collection Time: 05/05/17  8:20 AM   Result Value Ref Range    ABO O     RH(D)  Pos     Antibody Screen Neg     Test Valid Only At       [facility]    Specimen Expires 05/08/2017    Hepatitis B core antibody    Collection Time: 05/05/17  8:20 AM   Result Value Ref Range    Hepatitis B Core Val Nonreactive NR   Hepatitis B Surface Antibody    Collection Time: 05/05/17  8:20 AM   Result Value Ref Range    Hepatitis B Surface Antibody 273.65 (H) <8.00 m[IU]/mL   Hepatitis B surface antigen    Collection Time: 05/05/17  8:20 AM   Result Value Ref Range    Hep B Surface Agn Nonreactive NR   Hepatitis C antibody    Collection Time: 05/05/17  8:20 AM   Result Value Ref Range    Hepatitis C Antibody  NR     Nonreactive   Assay performance characteristics have not been established for newborns,   infants, and children     HIV Antigen Antibody Combo Pretransplant    Collection Time: 05/05/17  8:20 AM "   Result Value Ref Range    HIV Antigen Antibody Combo Pretransplant  NR     Nonreactive   HIV-1 p24 Ag & HIV-1/HIV-2 Ab Not Detected     Anti Treponema    Collection Time: 05/05/17  8:20 AM   Result Value Ref Range    Treponema pallidum Antibody Negative NEG   Lipid Profile    Collection Time: 05/05/17  8:20 AM   Result Value Ref Range    Cholesterol 164 <200 mg/dL    Triglycerides 31 <150 mg/dL    HDL Cholesterol 82 >49 mg/dL    LDL Cholesterol Calculated 76 <100 mg/dL    Non HDL Cholesterol 82 <130 mg/dL   Comprehensive metabolic panel    Collection Time: 05/05/17  8:20 AM   Result Value Ref Range    Sodium 140 133 - 144 mmol/L    Potassium 4.0 3.4 - 5.3 mmol/L    Chloride 106 94 - 109 mmol/L    Carbon Dioxide 26 20 - 32 mmol/L    Anion Gap 8 3 - 14 mmol/L    Glucose 83 70 - 99 mg/dL    Urea Nitrogen 8 7 - 30 mg/dL    Creatinine 0.63 0.52 - 1.04 mg/dL    GFR Estimate >90  Non  GFR Calc   >60 mL/min/1.7m2    GFR Estimate If Black >90   GFR Calc   >60 mL/min/1.7m2    Calcium 8.4 (L) 8.5 - 10.1 mg/dL    Bilirubin Total 0.6 0.2 - 1.3 mg/dL    Albumin 4.0 3.4 - 5.0 g/dL    Protein Total 7.8 6.8 - 8.8 g/dL    Alkaline Phosphatase 51 40 - 150 U/L    ALT 20 0 - 50 U/L    AST 19 0 - 45 U/L   HCG quantitative pregnancy    Collection Time: 05/05/17  8:20 AM   Result Value Ref Range    HCG Quantitative Serum <1 0 - 5 IU/L   Phosphorus    Collection Time: 05/05/17  8:20 AM   Result Value Ref Range    Phosphorus 3.0 2.5 - 4.5 mg/dL   Hemoglobin A1c    Collection Time: 05/05/17  8:20 AM   Result Value Ref Range    Hemoglobin A1C 5.1 4.3 - 6.0 %   INR    Collection Time: 05/05/17  8:20 AM   Result Value Ref Range    INR 1.04 0.86 - 1.14   Partial thromboplastin time    Collection Time: 05/05/17  8:20 AM   Result Value Ref Range    PTT 30 22 - 37 sec   CBC with platelets    Collection Time: 05/05/17  8:20 AM   Result Value Ref Range    WBC 4.0 4.0 - 11.0 10e9/L    RBC Count 4.56 3.8 - 5.2 10e12/L     Hemoglobin 14.1 11.7 - 15.7 g/dL    Hematocrit 41.0 35.0 - 47.0 %    MCV 90 78 - 100 fl    MCH 30.9 26.5 - 33.0 pg    MCHC 34.4 31.5 - 36.5 g/dL    RDW 11.2 10.0 - 15.0 %    Platelet Count 250 150 - 450 10e9/L   CMV Antibody IgG    Collection Time: 05/05/17  8:20 AM   Result Value Ref Range    CMV Antibody IgG 0.3 0.0 - 0.8 AI   EBV Capsid Antibody IgG    Collection Time: 05/05/17  8:20 AM   Result Value Ref Range    EBV Capsid Antibody IgG  0.0 - 0.8 AI     <0.2  No detectable antibody.   Antibody index (AI) values reflect qualitative changes in antibody   concentration that cannot be directly associated with clinical condition or   disease state.     EBV Capsid Antibody IgM    Collection Time: 05/05/17  8:20 AM   Result Value Ref Range    EBV Capsid Antibody IgM 0.2 0.0 - 0.8 AI   M Tuberculosis by Quantiferon    Collection Time: 05/05/17  8:20 AM   Result Value Ref Range    M Tuberculosis Result Negative NEG    M Tuberculosis Antigen Value 0.00 IU/mL   Uric acid    Collection Time: 05/05/17  8:20 AM   Result Value Ref Range    Uric Acid 3.1 2.6 - 6.0 mg/dL   Routine UA with microscopic    Collection Time: 05/05/17  8:39 AM   Result Value Ref Range    Color Urine Straw     Appearance Urine Clear     Glucose Urine Negative NEG mg/dL    Bilirubin Urine Negative NEG    Ketones Urine Negative NEG mg/dL    Specific Gravity Urine 1.003 1.003 - 1.035    Blood Urine Negative NEG    pH Urine 7.0 5.0 - 7.0 pH    Protein Albumin Urine Negative NEG mg/dL    Urobilinogen mg/dL 0.0 0.0 - 2.0 mg/dL    Nitrite Urine Negative NEG    Leukocyte Esterase Urine Negative NEG    Source Midstream Urine     WBC Urine <1 0 - 2 /HPF    RBC Urine 1 0 - 2 /HPF    Bacteria Urine Few (A) NEG /HPF    Squamous Epithelial /HPF Urine <1 0 - 1 /HPF   Microalbumin quantitative random urine    Collection Time: 05/05/17  8:39 AM   Result Value Ref Range    Creatinine Urine 24 mg/dL    Albumin Urine mg/L <5 mg/L    Albumin Urine mg/g Cr Unable to  calculate due to low value 0 - 25 mg/g Cr   Protein  random urine    Collection Time: 05/05/17  8:39 AM   Result Value Ref Range    Protein Random Urine <0.05 g/L    Protein Total Urine g/gr Creatinine Unable to calculate due to low value 0 - 0.2 g/g Cr   ABO type [[id number]]    Collection Time: 05/05/17  8:49 AM   Result Value Ref Range    ABO O     RH(D)  Pos     Specimen Expires 05/08/2017    EKG 12-lead complete w/read - Clinics    Collection Time: 05/05/17  9:31 AM   Result Value Ref Range    Interpretation ECG Click View Image link to view waveform and result    Iohexol    Collection Time: 05/05/17 11:14 AM   Result Value Ref Range    Iohexol t1 7.93 mg/dL    Iohexol t2 3.31 mg/dL    Iohexol Body Surface Area 1.893 m2    Iohexol Raw Clearance 104 mL/min    Iohexol Std Clearance 96 /1.73 m2           Mayra Najera MD

## 2017-05-05 NOTE — LETTER
5/5/2017       RE: Vanessa Larsen  [address] [facility] [address]     Dear Colleague,    Thank you for referring your patient, Vanessa Larsen, to the [facility] NEPHROLOGY at [facility]. Please see a copy of my visit note below.        Again, thank you for allowing me to participate in the care of your patient.      Sincerely,    Mayra Najera MD

## 2017-05-05 NOTE — PROGRESS NOTES
Transplant Surgery Consult Note    Medical record number: [id number]  YOB: 1990,   Consult requested by the patient for evaluation of kidney donation candidacy.    Assessment and Recommendations: Ms. Larsen appears to be a good candidate for kidney donation at this point in the evaluation. The following issues will need to be addressed prior to formal review:     The majority of our visit today was spent in counseling regarding the medical and surgical risks of kidney donation, the typical scottie-and post-operative experience and recovery/return to work pattern.  We also talked about post-op visits and longer term health care maintenance, as well as the implications of having one remaining kidney. This discussion included, but was not limited to rates of complications such as bleeding, infection, need for transfusion, reoperation, other organ injury, future bowel obstruction, incisional hernia, port site pain, varicocele, venous thrombosis, pulmonary embolism, renal failure, and death (3 per 10,000). The patient understands that if end stage renal failure happens that dialysis or transplant would be required.  At the conclusion of the visit, all questions had been answered and Ms. Larsen's candidacy for donation will be reviewed at our Multidisciplinary Donor Selection Committee. She will stay in contact with the nurse coordinator with any concerns.      Total time: 35 minutes  Counselling time: 30 minutes            Silviano Lange MD  Department of Surgery  ---------------------------------------------------------------------------------------------------    HPI: Vanessa Larsen is a 27 year old year old female who presents for a kidney donor evaluation.  Patient would like to donate to her nonbiological aunt.  She has no personal or family history of diabetes or kidney disease.  She has no history of thrombosis.  She is not done with childbearing.      Personal history of:   No    Yes  Cancer:    [x]      []                  Comment:     Diabetes                   [x]      []                 Comment:    Светлана   [x]      []                Comment:       Hepatitis :  [x]      []                 Comment:    Tuberculosis   [x]      []                 Comment:   Back or neck pain:  [x]      []                 Comment:      Kidney stones   [x]      []                 Comment:                  Kidney infections  [x]      []                 Comment:           Urinary retention   [x]      []                 Comment:   Regular NSAID use:  [x]      []                Comment:      Constipation:   [x]      []                Comment:      Spiritism  [x]      []                Comment:      Other:    [x]      []                Comment:         Past Medical History:   Diagnosis Date     Seasonal allergies      Past Surgical History:   Procedure Laterality Date     HC TOOTH EXTRACTION W/FORCEP       Family History   Problem Relation Age of Onset     Arthritis Mother      Arthritis Father      Pacemaker Father      Breast Cancer Maternal Grandmother      Breast Cancer Other      Social History     Social History     Marital status: Single     Spouse name: N/A     Number of children: 0     Years of education: 18     Occupational History     teacher      [employer]     Social History Main Topics     Smoking status: Never Smoker     Smokeless tobacco: Not on file     Alcohol use No     Drug use: No     Sexual activity: No     Other Topics Concern     Not on file     Social History Narrative       ROS:   CONSTITUTIONAL:  No fevers or chills  EYES: negative for icterus  ENT:  negative for hearing loss, tinnitus and sore throat  RESPIRATORY:  negative for cough, sputum, dyspnea  CARDIOVASCULAR:  negative for chest pain  GASTROINTESTINAL:  negative for nausea, vomiting, diarrhea or constipation  GENITOURINARY:  negative for incontinence, dysuria, bladder emptying problems  HEME:  No easy bruising  INTEGUMENT:  negative  "for rash and pruritus  NEURO:  Negative for headache, seizure disorder    Allergies:   Allergies   Allergen Reactions     Erythromycin        Medications:      Exam:   Vital Signs 5/5/2017 5/5/2017 5/5/2017 5/5/2017   Systolic  118 108 108   Diastolic  74 76 72   Pulse  100     Temperature  98.3     Respirations  16     Weight (LB)  165 lb 1.6 oz     Height  5' 7\"     BMI (Calculated)  25.91     Pain 0      O2  99     Appearance: in no apparent distress.   Skin: normal  Head and Neck: Normal, no rashes or jaundice  Respiratory: normal respiratory excursions, no audible wheeze  Cardiovascular: RRR  Abdomen: flat, No distention and Liver, spleen, and kidneys not palpably enlarged   Extremeties: Edema, none  Neuro: without deficit     Diagnostics:   Recent Results (from the past 336 hour(s))   ABO/Rh type and screen    Collection Time: 05/05/17  8:20 AM   Result Value Ref Range    ABO O     RH(D)  Pos     Antibody Screen Neg     Test Valid Only At       [facility]    Specimen Expires 05/08/2017    Hepatitis B core antibody    Collection Time: 05/05/17  8:20 AM   Result Value Ref Range    Hepatitis B Core Val Nonreactive NR   Hepatitis B Surface Antibody    Collection Time: 05/05/17  8:20 AM   Result Value Ref Range    Hepatitis B Surface Antibody 273.65 (H) <8.00 m[IU]/mL   Hepatitis B surface antigen    Collection Time: 05/05/17  8:20 AM   Result Value Ref Range    Hep B Surface Agn Nonreactive NR   Hepatitis C antibody    Collection Time: 05/05/17  8:20 AM   Result Value Ref Range    Hepatitis C Antibody  NR     Nonreactive   Assay performance characteristics have not been established for newborns,   infants, and children     HIV Antigen Antibody Combo Pretransplant    Collection Time: 05/05/17  8:20 AM   Result Value Ref Range    HIV Antigen Antibody Combo Pretransplant  NR     Nonreactive   HIV-1 p24 Ag & HIV-1/HIV-2 Ab Not Detected     Anti Treponema    Collection " Time: 05/05/17  8:20 AM   Result Value Ref Range    Treponema pallidum Antibody Negative NEG   Lipid Profile    Collection Time: 05/05/17  8:20 AM   Result Value Ref Range    Cholesterol 164 <200 mg/dL    Triglycerides 31 <150 mg/dL    HDL Cholesterol 82 >49 mg/dL    LDL Cholesterol Calculated 76 <100 mg/dL    Non HDL Cholesterol 82 <130 mg/dL   Comprehensive metabolic panel    Collection Time: 05/05/17  8:20 AM   Result Value Ref Range    Sodium 140 133 - 144 mmol/L    Potassium 4.0 3.4 - 5.3 mmol/L    Chloride 106 94 - 109 mmol/L    Carbon Dioxide 26 20 - 32 mmol/L    Anion Gap 8 3 - 14 mmol/L    Glucose 83 70 - 99 mg/dL    Urea Nitrogen 8 7 - 30 mg/dL    Creatinine 0.63 0.52 - 1.04 mg/dL    GFR Estimate >90  Non  GFR Calc   >60 mL/min/1.7m2    GFR Estimate If Black >90   GFR Calc   >60 mL/min/1.7m2    Calcium 8.4 (L) 8.5 - 10.1 mg/dL    Bilirubin Total 0.6 0.2 - 1.3 mg/dL    Albumin 4.0 3.4 - 5.0 g/dL    Protein Total 7.8 6.8 - 8.8 g/dL    Alkaline Phosphatase 51 40 - 150 U/L    ALT 20 0 - 50 U/L    AST 19 0 - 45 U/L   HCG quantitative pregnancy    Collection Time: 05/05/17  8:20 AM   Result Value Ref Range    HCG Quantitative Serum <1 0 - 5 IU/L   Phosphorus    Collection Time: 05/05/17  8:20 AM   Result Value Ref Range    Phosphorus 3.0 2.5 - 4.5 mg/dL   Hemoglobin A1c    Collection Time: 05/05/17  8:20 AM   Result Value Ref Range    Hemoglobin A1C 5.1 4.3 - 6.0 %   INR    Collection Time: 05/05/17  8:20 AM   Result Value Ref Range    INR 1.04 0.86 - 1.14   Partial thromboplastin time    Collection Time: 05/05/17  8:20 AM   Result Value Ref Range    PTT 30 22 - 37 sec   CBC with platelets    Collection Time: 05/05/17  8:20 AM   Result Value Ref Range    WBC 4.0 4.0 - 11.0 10e9/L    RBC Count 4.56 3.8 - 5.2 10e12/L    Hemoglobin 14.1 11.7 - 15.7 g/dL    Hematocrit 41.0 35.0 - 47.0 %    MCV 90 78 - 100 fl    MCH 30.9 26.5 - 33.0 pg    MCHC 34.4 31.5 - 36.5 g/dL    RDW 11.2 10.0 -  15.0 %    Platelet Count 250 150 - 450 10e9/L   CMV Antibody IgG    Collection Time: 05/05/17  8:20 AM   Result Value Ref Range    CMV Antibody IgG 0.3 0.0 - 0.8 AI   EBV Capsid Antibody IgG    Collection Time: 05/05/17  8:20 AM   Result Value Ref Range    EBV Capsid Antibody IgG  0.0 - 0.8 AI     <0.2  No detectable antibody.   Antibody index (AI) values reflect qualitative changes in antibody   concentration that cannot be directly associated with clinical condition or   disease state.     EBV Capsid Antibody IgM    Collection Time: 05/05/17  8:20 AM   Result Value Ref Range    EBV Capsid Antibody IgM 0.2 0.0 - 0.8 AI   M Tuberculosis by Quantiferon    Collection Time: 05/05/17  8:20 AM   Result Value Ref Range    M Tuberculosis Result Negative NEG    M Tuberculosis Antigen Value 0.00 IU/mL   Uric acid    Collection Time: 05/05/17  8:20 AM   Result Value Ref Range    Uric Acid 3.1 2.6 - 6.0 mg/dL   Routine UA with microscopic    Collection Time: 05/05/17  8:39 AM   Result Value Ref Range    Color Urine Straw     Appearance Urine Clear     Glucose Urine Negative NEG mg/dL    Bilirubin Urine Negative NEG    Ketones Urine Negative NEG mg/dL    Specific Gravity Urine 1.003 1.003 - 1.035    Blood Urine Negative NEG    pH Urine 7.0 5.0 - 7.0 pH    Protein Albumin Urine Negative NEG mg/dL    Urobilinogen mg/dL 0.0 0.0 - 2.0 mg/dL    Nitrite Urine Negative NEG    Leukocyte Esterase Urine Negative NEG    Source Midstream Urine     WBC Urine <1 0 - 2 /HPF    RBC Urine 1 0 - 2 /HPF    Bacteria Urine Few (A) NEG /HPF    Squamous Epithelial /HPF Urine <1 0 - 1 /HPF   Microalbumin quantitative random urine    Collection Time: 05/05/17  8:39 AM   Result Value Ref Range    Creatinine Urine 24 mg/dL    Albumin Urine mg/L <5 mg/L    Albumin Urine mg/g Cr Unable to calculate due to low value 0 - 25 mg/g Cr   Protein  random urine    Collection Time: 05/05/17  8:39 AM   Result Value Ref Range    Protein Random Urine <0.05 g/L     Protein Total Urine g/gr Creatinine Unable to calculate due to low value 0 - 0.2 g/g Cr   ABO type [[id number]]    Collection Time: 05/05/17  8:49 AM   Result Value Ref Range    ABO O     RH(D)  Pos     Specimen Expires 05/08/2017    EKG 12-lead complete w/read - Clinics    Collection Time: 05/05/17  9:31 AM   Result Value Ref Range    Interpretation ECG Click View Image link to view waveform and result    Iohexol    Collection Time: 05/05/17 11:14 AM   Result Value Ref Range    Iohexol t1 7.93 mg/dL    Iohexol t2 3.31 mg/dL    Iohexol Body Surface Area 1.893 m2    Iohexol Raw Clearance 104 mL/min    Iohexol Std Clearance 96 /1.73 m2     Exam: XR CHEST 2 VW, 5/5/2017 3:05 PM     Indication: donor eval, Encounter for examination of potential donor  of organ and tissue     Comparison: None available.     Findings:   PA and lateral view the chest. Cardiac silhouette and pulmonary  vasculature are within normal limits. No focal airspace opacity,  pleural effusion, or pneumothorax. Visualized upper abdomen is  unremarkable. No acute osseous abnormalities.         Impression: No acute cardiopulmonary process.     I have personally reviewed the examination and initial interpretation  and I agree with the findings.     BARBARA SHAH MD    Abdominal CT Angiography and 3-D Procession dated 5/5/2017 3:27 PM     Clinical History: Potential renal donor evaluation.     Comparisons: None     Technique: Helical scans were obtained through the abdomen before the  administration of intravenous contrast as well as in arterial, venous,  and delayed phases after contrast injection. Source images were  reviewed as well as 3D and multi-planar reconstructions.     Contrast: 100 cc [id number]     Findings:      Right kidney:     1. Right kidney measures: 10.9 cm.  2. Renal arteries: 1 renal artery, length from ostium to first branch:  3.9 cm.  3. Renal veins: 1 renal vein  4. Ureters: 1 ureter  5. Renal parenchyma: No masses or stones  6.  Renal volume: 158 cc     Left kidney:     1. Left kidney measures: 10.9 cm.  2. Renal arteries: 2 renal arteries. Craniocaudad distance between the  2 arteries: 2 mm.  -Superior larger artery: Length from ostium to first branch: 3.2 cm.  -Inferior smaller artery: Length from ostium to first branch: 5.4 cm.  3. Renal veins: 1 renal vein  4. Ureters: 1 ureter  5. Renal parenchyma: No masses or stones  6. Renal volume: 167 cc     The aorta and iliac arteries are normal in appearance. The origins of  the celiac artery, the superior mesenteric artery, and inferior  mesenteric artery are patent and the arteries are normal in  appearance.     The splenic vein, portal vein, hepatic veins, inferior vena cava, are  patent and normal in appearance.     Remainder of the abdomen and pelvis: The liver, gallbladder, adrenal  glands, spleen, pancreas, stomach, duodenum are without focal  abnormality.     No focal bowel wall thickening or obstruction. No free air or free  fluid. Gynecologic structures are within normal limits     Bones: No suspicious osseous lesions.         Impression:   1. The right kidney has one renal artery, one renal vein, one ureter.  Details as above.  2. The left kidney has 2 renal arteries, one renal vein, one ureter.  Details as above.     I have personally reviewed the examination and initial interpretation  and I agree with the findings.     KELLY DEAN

## 2017-05-05 NOTE — PROGRESS NOTES
Assessment and Plan:  1. Prospective kidney transplant donor with no issues that need to be addressed prior to donation. Patient's blood pressure is acceptable at this visit, kidney function appears to be acceptable with Iohexol pending, and urinalysis is bland.    Discussed the risks of donating a kidney, including the surgical risk and the possible risks of living with one kidney.    Education about expected post-donation kidney function and how chronic kidney disease (CKD) and end stage kidney disease (ESKD) might potentially impact the donor in the future, include, but not limited to:       - On average, donors will have 25-35% permanent loss of kidney function at donation.       - Baseline risk of ESKD may slightly exceed that of members of the general population with the same demographic profile.       - Donor risks must be interpreted in light of known epidemiology of both CKD or ESKD, such as that CKD generally develops in midlife (40-50 years old) and ESKD generally develops after age 60.       - Medical evaluation of young potential donors cannot predict lifetime risk of CKD or ESKD.       - Donors may be at higher risk for CKD if they sustain damage to the remaining kidney.       - Development of CKD and progression of ESKD may be more rapid with only 1 kidney.       - Some type of kidney replacement therapy, either kidney transplant or dialysis, is required when reaching ESKD.    Potential medical or surgical risks include, but not limited to:       - Death.       - Scars, pain, fatigue, and other consequences typical of any surgical procedure.       - Decreased kidney function.       - Abdominal or bowel symptoms, such as bloating and nausea, and developing bowel obstruction.       - Kidney failure (ESKD) and the need for a kidney transplant or dialysis for the donor.       - Impact of obesity, hypertension, or other donor-specific medical conditions on morbidity and mortality of the potential  donor.    Patients overall evaluation will be discussed with the transplant team and a final recommendation on the patients' suitability to be a kidney transplant donor will be made at that time.    Consult:  Vanessa Larsen was seen in consultation at the request of Dr. Silviano Lange for evaluation as a potential kidney transplant donor.    Reason for Visit:  Vanessa Larsen is a 27 year old female who presents for a kidney donor evaluation.  Patient would like to donate to her aunt.    HPI:    Patient is a 26 yo  F., in very good health, who wants to donate to her aunt, as they are very close, and she would like to help her. She feels she is the only one who can do this, as her schedule allowed her to take days off, and her work dose not involve physical activity, so she can go back to work quiqe. She dose not exercise regular, but she dose not have any cardiac symptoms, and can walks for miles.Dose not check her BP at home.  Her only medical complain is occasionally headache that is relived with Ibuprofen. She dose not take it more then 4 times a mo.She likes to spend time outside, and she has intense exposure to sun, but she never have a skin cancer, is following with dermatology.   She never had a pap smear as she is virgin.          Kidney Disease Hx:       h/o Kidney Problems: No  Family h/o Genetic Kidney Disease: No       h/o HTN: No    Usual BP: Not checked       h/o Protein in Urine: No  h/o Blood in Urine: No       h/o Kidney Stones: No  h/o Kidney Injury: No       h/o Recurrent UTI: No  h/o Genitourinary Problems: No       h/o Chronic NSAID Use: Yes          Other Medical Hx:       h/o DM: No   h/o Gestational DM: Not applicable       h/o Preeclampsia: Not applicable          h/o Gastrointestinal, Pancreas or Liver Problems: No       h/o Lung or Heart Problems: No       h/o Hematologic Problems: No  h/o Bleeding or Clotting Problems: No       h/o Cancer: no       h/o Infection Problems: No          Skin Cancer Risk:       h/o more than 50 moles: No       h/o extensive sun exposure: Yes        h/o melanoma: No       Family h/o melanoma: No         Mental Health Assessment:       h/o Depression: No       h/o Psychiatric Illness: No       h/o Suicidal Attempt(s): No    ROS:   A comprehensive review of systems was obtained and negative, except as noted in the HPI or PMH.    PMH:   History was taken from the patient as noted below.  Past Medical History:   Diagnosis Date     Seasonal allergies        PSH:   Past Surgical History:   Procedure Laterality Date     HC TOOTH EXTRACTION W/FORCEP       Personal or family history of anesthesia problems: No    Family Hx:   Family History   Problem Relation Age of Onset     Arthritis Mother      Arthritis Father      Pacemaker Father      Breast Cancer Maternal Grandmother      Breast Cancer Other           Specific Family Hx:       FH of DM: No       FH of CAD: Yes father with stents     FH of HTN: Yes, mother         FH of Cancer: Yes, non-melanoma skin cancer    FH of Kidney Cancer: NO     Personal Hx:   Social History     Social History     Marital status: Single     Spouse name: N/A     Number of children: 0     Years of education: 18     Occupational History     teacher      [employer]     Social History Main Topics     Smoking status: Never Smoker     Smokeless tobacco: Not on file     Alcohol use No     Drug use: No     Sexual activity: No     Other Topics Concern     Not on file     Social History Narrative          Specific Social Hx:       Health Insurance Status: Yes       Employment Status: Full time  Occupation: teacher                        Living Arrangements: lives with an adult        Social Support: Yes       Presence of increased risk for disease transmission behaviors as defined by PHS guidelines: No        Allergies:  Allergies   Allergen Reactions     Erythromycin        Medications:  Prior to Admission medications    Not on  "File       Vitals:  Vital Signs 5/5/2017 5/5/2017 5/5/2017   Systolic 118 108 108   Diastolic 74 76 72   Pulse 100 - -   Temperature 98.3 - -   Respirations 16 - -   Weight (LB) 165 lb 1.6 oz - -   Height 5' 7\" - -   BMI (Calculated) 25.91 - -   Pain - - -   O2 99 - -       Exam:   GENERAL APPEARANCE: alert and no distress  HENT: mouth without ulcers or lesions  LYMPHATICS: no cervical or supraclavicular nodes  RESP: lungs clear to auscultation - no rales, rhonchi or wheezes  CV: regular rhythm, normal rate, no rub, no murmur  EDEMA: no LE edema bilaterally  ABDOMEN: soft, nondistended, nontender, bowel sounds normal  MS: extremities normal - no gross deformities noted, no evidence of inflammation in joints, no muscle tenderness  SKIN: no rash    Results:   Labs and imaging were ordered for this visit and reviewed by me.  Recent Results (from the past 336 hour(s))   ABO/Rh type and screen    Collection Time: 05/05/17  8:20 AM   Result Value Ref Range    ABO O     RH(D)  Pos     Antibody Screen Neg     Test Valid Only At       [facility]    Specimen Expires 05/08/2017    Hepatitis B core antibody    Collection Time: 05/05/17  8:20 AM   Result Value Ref Range    Hepatitis B Core Val Nonreactive NR   Hepatitis B Surface Antibody    Collection Time: 05/05/17  8:20 AM   Result Value Ref Range    Hepatitis B Surface Antibody 273.65 (H) <8.00 m[IU]/mL   Hepatitis B surface antigen    Collection Time: 05/05/17  8:20 AM   Result Value Ref Range    Hep B Surface Agn Nonreactive NR   Hepatitis C antibody    Collection Time: 05/05/17  8:20 AM   Result Value Ref Range    Hepatitis C Antibody  NR     Nonreactive   Assay performance characteristics have not been established for newborns,   infants, and children     HIV Antigen Antibody Combo Pretransplant    Collection Time: 05/05/17  8:20 AM   Result Value Ref Range    HIV Antigen Antibody Combo Pretransplant  NR     Nonreactive   " HIV-1 p24 Ag & HIV-1/HIV-2 Ab Not Detected     Anti Treponema    Collection Time: 05/05/17  8:20 AM   Result Value Ref Range    Treponema pallidum Antibody Negative NEG   Lipid Profile    Collection Time: 05/05/17  8:20 AM   Result Value Ref Range    Cholesterol 164 <200 mg/dL    Triglycerides 31 <150 mg/dL    HDL Cholesterol 82 >49 mg/dL    LDL Cholesterol Calculated 76 <100 mg/dL    Non HDL Cholesterol 82 <130 mg/dL   Comprehensive metabolic panel    Collection Time: 05/05/17  8:20 AM   Result Value Ref Range    Sodium 140 133 - 144 mmol/L    Potassium 4.0 3.4 - 5.3 mmol/L    Chloride 106 94 - 109 mmol/L    Carbon Dioxide 26 20 - 32 mmol/L    Anion Gap 8 3 - 14 mmol/L    Glucose 83 70 - 99 mg/dL    Urea Nitrogen 8 7 - 30 mg/dL    Creatinine 0.63 0.52 - 1.04 mg/dL    GFR Estimate >90  Non  GFR Calc   >60 mL/min/1.7m2    GFR Estimate If Black >90   GFR Calc   >60 mL/min/1.7m2    Calcium 8.4 (L) 8.5 - 10.1 mg/dL    Bilirubin Total 0.6 0.2 - 1.3 mg/dL    Albumin 4.0 3.4 - 5.0 g/dL    Protein Total 7.8 6.8 - 8.8 g/dL    Alkaline Phosphatase 51 40 - 150 U/L    ALT 20 0 - 50 U/L    AST 19 0 - 45 U/L   HCG quantitative pregnancy    Collection Time: 05/05/17  8:20 AM   Result Value Ref Range    HCG Quantitative Serum <1 0 - 5 IU/L   Phosphorus    Collection Time: 05/05/17  8:20 AM   Result Value Ref Range    Phosphorus 3.0 2.5 - 4.5 mg/dL   Hemoglobin A1c    Collection Time: 05/05/17  8:20 AM   Result Value Ref Range    Hemoglobin A1C 5.1 4.3 - 6.0 %   INR    Collection Time: 05/05/17  8:20 AM   Result Value Ref Range    INR 1.04 0.86 - 1.14   Partial thromboplastin time    Collection Time: 05/05/17  8:20 AM   Result Value Ref Range    PTT 30 22 - 37 sec   CBC with platelets    Collection Time: 05/05/17  8:20 AM   Result Value Ref Range    WBC 4.0 4.0 - 11.0 10e9/L    RBC Count 4.56 3.8 - 5.2 10e12/L    Hemoglobin 14.1 11.7 - 15.7 g/dL    Hematocrit 41.0 35.0 - 47.0 %    MCV 90 78 - 100 fl     MCH 30.9 26.5 - 33.0 pg    MCHC 34.4 31.5 - 36.5 g/dL    RDW 11.2 10.0 - 15.0 %    Platelet Count 250 150 - 450 10e9/L   CMV Antibody IgG    Collection Time: 05/05/17  8:20 AM   Result Value Ref Range    CMV Antibody IgG 0.3 0.0 - 0.8 AI   EBV Capsid Antibody IgG    Collection Time: 05/05/17  8:20 AM   Result Value Ref Range    EBV Capsid Antibody IgG  0.0 - 0.8 AI     <0.2  No detectable antibody.   Antibody index (AI) values reflect qualitative changes in antibody   concentration that cannot be directly associated with clinical condition or   disease state.     EBV Capsid Antibody IgM    Collection Time: 05/05/17  8:20 AM   Result Value Ref Range    EBV Capsid Antibody IgM 0.2 0.0 - 0.8 AI   M Tuberculosis by Quantiferon    Collection Time: 05/05/17  8:20 AM   Result Value Ref Range    M Tuberculosis Result Negative NEG    M Tuberculosis Antigen Value 0.00 IU/mL   Uric acid    Collection Time: 05/05/17  8:20 AM   Result Value Ref Range    Uric Acid 3.1 2.6 - 6.0 mg/dL   Routine UA with microscopic    Collection Time: 05/05/17  8:39 AM   Result Value Ref Range    Color Urine Straw     Appearance Urine Clear     Glucose Urine Negative NEG mg/dL    Bilirubin Urine Negative NEG    Ketones Urine Negative NEG mg/dL    Specific Gravity Urine 1.003 1.003 - 1.035    Blood Urine Negative NEG    pH Urine 7.0 5.0 - 7.0 pH    Protein Albumin Urine Negative NEG mg/dL    Urobilinogen mg/dL 0.0 0.0 - 2.0 mg/dL    Nitrite Urine Negative NEG    Leukocyte Esterase Urine Negative NEG    Source Midstream Urine     WBC Urine <1 0 - 2 /HPF    RBC Urine 1 0 - 2 /HPF    Bacteria Urine Few (A) NEG /HPF    Squamous Epithelial /HPF Urine <1 0 - 1 /HPF   Microalbumin quantitative random urine    Collection Time: 05/05/17  8:39 AM   Result Value Ref Range    Creatinine Urine 24 mg/dL    Albumin Urine mg/L <5 mg/L    Albumin Urine mg/g Cr Unable to calculate due to low value 0 - 25 mg/g Cr   Protein  random urine    Collection Time: 05/05/17   8:39 AM   Result Value Ref Range    Protein Random Urine <0.05 g/L    Protein Total Urine g/gr Creatinine Unable to calculate due to low value 0 - 0.2 g/g Cr   ABO type [[id number]]    Collection Time: 05/05/17  8:49 AM   Result Value Ref Range    ABO O     RH(D)  Pos     Specimen Expires 05/08/2017    EKG 12-lead complete w/read - Clinics    Collection Time: 05/05/17  9:31 AM   Result Value Ref Range    Interpretation ECG Click View Image link to view waveform and result    Iohexol    Collection Time: 05/05/17 11:14 AM   Result Value Ref Range    Iohexol t1 7.93 mg/dL    Iohexol t2 3.31 mg/dL    Iohexol Body Surface Area 1.893 m2    Iohexol Raw Clearance 104 mL/min    Iohexol Std Clearance 96 /1.73 m2

## 2017-05-05 NOTE — LETTER
5/5/2017       RE: Vanessa Larsen  [address] [facility] [address]     Dear Colleague,    Thank you for referring your patient, Vanessa Larsen, to the [facility] SOLID ORGAN TRANSPLANT at [facility]. Please see a copy of my visit note below.    Transplant Surgery Consult Note    Medical record number: [id number]  YOB: 1990,   Consult requested by the patient for evaluation of kidney donation candidacy.    Assessment and Recommendations: Ms. Larsen appears to be a good candidate for kidney donation at this point in the evaluation. The following issues will need to be addressed prior to formal review:     The majority of our visit today was spent in counseling regarding the medical and surgical risks of kidney donation, the typical scottie-and post-operative experience and recovery/return to work pattern.  We also talked about post-op visits and longer term health care maintenance, as well as the implications of having one remaining kidney. This discussion included, but was not limited to rates of complications such as bleeding, infection, need for transfusion, reoperation, other organ injury, future bowel obstruction, incisional hernia, port site pain, varicocele, venous thrombosis, pulmonary embolism, renal failure, and death (3 per 10,000). The patient understands that if end stage renal failure happens that dialysis or transplant would be required.  At the conclusion of the visit, all questions had been answered and Ms. Larsen's candidacy for donation will be reviewed at our Multidisciplinary Donor Selection Committee. She will stay in contact with the nurse coordinator with any concerns.      Total time: 35 minutes  Counselling time: 30 minutes            Silviano Lange MD  Department of Surgery  ---------------------------------------------------------------------------------------------------    HPI: Vanessa Larsen is a 27 year old year old female who presents for a  kidney donor evaluation.  Patient would like to donate to her nonbiological aunt.  She has no personal or family history of diabetes or kidney disease.  She has no history of thrombosis.  She is not done with childbearing.      Personal history of:   No    Yes  Cancer:    [x]      []                 Comment:     Diabetes                   [x]      []                 Comment:    Thombosis   [x]      []                Comment:       Hepatitis :  [x]      []                 Comment:    Tuberculosis   [x]      []                 Comment:   Back or neck pain:  [x]      []                 Comment:      Kidney stones   [x]      []                 Comment:                  Kidney infections  [x]      []                 Comment:           Urinary retention   [x]      []                 Comment:   Regular NSAID use:  [x]      []                Comment:      Constipation:   [x]      []                Comment:      Synagogue  [x]      []                Comment:      Other:    [x]      []                Comment:         Past Medical History:   Diagnosis Date     Seasonal allergies      Past Surgical History:   Procedure Laterality Date     HC TOOTH EXTRACTION W/FORCEP       Family History   Problem Relation Age of Onset     Arthritis Mother      Arthritis Father      Pacemaker Father      Breast Cancer Maternal Grandmother      Breast Cancer Other      Social History     Social History     Marital status: Single     Spouse name: N/A     Number of children: 0     Years of education: 18     Occupational History     teacher      [employer]     Social History Main Topics     Smoking status: Never Smoker     Smokeless tobacco: Not on file     Alcohol use No     Drug use: No     Sexual activity: No     Other Topics Concern     Not on file     Social History Narrative       ROS:   CONSTITUTIONAL:  No fevers or chills  EYES: negative for icterus  ENT:  negative for hearing loss, tinnitus and sore  "throat  RESPIRATORY:  negative for cough, sputum, dyspnea  CARDIOVASCULAR:  negative for chest pain  GASTROINTESTINAL:  negative for nausea, vomiting, diarrhea or constipation  GENITOURINARY:  negative for incontinence, dysuria, bladder emptying problems  HEME:  No easy bruising  INTEGUMENT:  negative for rash and pruritus  NEURO:  Negative for headache, seizure disorder    Allergies:   Allergies   Allergen Reactions     Erythromycin        Medications:      Exam:   Vital Signs 5/5/2017 5/5/2017 5/5/2017 5/5/2017   Systolic  118 108 108   Diastolic  74 76 72   Pulse  100     Temperature  98.3     Respirations  16     Weight (LB)  165 lb 1.6 oz     Height  5' 7\"     BMI (Calculated)  25.91     Pain 0      O2  99     Appearance: in no apparent distress.   Skin: normal  Head and Neck: Normal, no rashes or jaundice  Respiratory: normal respiratory excursions, no audible wheeze  Cardiovascular: RRR  Abdomen: flat, No distention and Liver, spleen, and kidneys not palpably enlarged   Extremeties: Edema, none  Neuro: without deficit     Diagnostics:   Recent Results (from the past 336 hour(s))   ABO/Rh type and screen    Collection Time: 05/05/17  8:20 AM   Result Value Ref Range    ABO O     RH(D)  Pos     Antibody Screen Neg     Test Valid Only At       [facility]    Specimen Expires 05/08/2017    Hepatitis B core antibody    Collection Time: 05/05/17  8:20 AM   Result Value Ref Range    Hepatitis B Core Val Nonreactive NR   Hepatitis B Surface Antibody    Collection Time: 05/05/17  8:20 AM   Result Value Ref Range    Hepatitis B Surface Antibody 273.65 (H) <8.00 m[IU]/mL   Hepatitis B surface antigen    Collection Time: 05/05/17  8:20 AM   Result Value Ref Range    Hep B Surface Agn Nonreactive NR   Hepatitis C antibody    Collection Time: 05/05/17  8:20 AM   Result Value Ref Range    Hepatitis C Antibody  NR     Nonreactive   Assay performance characteristics have not been " established for newborns,   infants, and children     HIV Antigen Antibody Combo Pretransplant    Collection Time: 05/05/17  8:20 AM   Result Value Ref Range    HIV Antigen Antibody Combo Pretransplant  NR     Nonreactive   HIV-1 p24 Ag & HIV-1/HIV-2 Ab Not Detected     Anti Treponema    Collection Time: 05/05/17  8:20 AM   Result Value Ref Range    Treponema pallidum Antibody Negative NEG   Lipid Profile    Collection Time: 05/05/17  8:20 AM   Result Value Ref Range    Cholesterol 164 <200 mg/dL    Triglycerides 31 <150 mg/dL    HDL Cholesterol 82 >49 mg/dL    LDL Cholesterol Calculated 76 <100 mg/dL    Non HDL Cholesterol 82 <130 mg/dL   Comprehensive metabolic panel    Collection Time: 05/05/17  8:20 AM   Result Value Ref Range    Sodium 140 133 - 144 mmol/L    Potassium 4.0 3.4 - 5.3 mmol/L    Chloride 106 94 - 109 mmol/L    Carbon Dioxide 26 20 - 32 mmol/L    Anion Gap 8 3 - 14 mmol/L    Glucose 83 70 - 99 mg/dL    Urea Nitrogen 8 7 - 30 mg/dL    Creatinine 0.63 0.52 - 1.04 mg/dL    GFR Estimate >90  Non  GFR Calc   >60 mL/min/1.7m2    GFR Estimate If Black >90   GFR Calc   >60 mL/min/1.7m2    Calcium 8.4 (L) 8.5 - 10.1 mg/dL    Bilirubin Total 0.6 0.2 - 1.3 mg/dL    Albumin 4.0 3.4 - 5.0 g/dL    Protein Total 7.8 6.8 - 8.8 g/dL    Alkaline Phosphatase 51 40 - 150 U/L    ALT 20 0 - 50 U/L    AST 19 0 - 45 U/L   HCG quantitative pregnancy    Collection Time: 05/05/17  8:20 AM   Result Value Ref Range    HCG Quantitative Serum <1 0 - 5 IU/L   Phosphorus    Collection Time: 05/05/17  8:20 AM   Result Value Ref Range    Phosphorus 3.0 2.5 - 4.5 mg/dL   Hemoglobin A1c    Collection Time: 05/05/17  8:20 AM   Result Value Ref Range    Hemoglobin A1C 5.1 4.3 - 6.0 %   INR    Collection Time: 05/05/17  8:20 AM   Result Value Ref Range    INR 1.04 0.86 - 1.14   Partial thromboplastin time    Collection Time: 05/05/17  8:20 AM   Result Value Ref Range    PTT 30 22 - 37 sec   CBC with  platelets    Collection Time: 05/05/17  8:20 AM   Result Value Ref Range    WBC 4.0 4.0 - 11.0 10e9/L    RBC Count 4.56 3.8 - 5.2 10e12/L    Hemoglobin 14.1 11.7 - 15.7 g/dL    Hematocrit 41.0 35.0 - 47.0 %    MCV 90 78 - 100 fl    MCH 30.9 26.5 - 33.0 pg    MCHC 34.4 31.5 - 36.5 g/dL    RDW 11.2 10.0 - 15.0 %    Platelet Count 250 150 - 450 10e9/L   CMV Antibody IgG    Collection Time: 05/05/17  8:20 AM   Result Value Ref Range    CMV Antibody IgG 0.3 0.0 - 0.8 AI   EBV Capsid Antibody IgG    Collection Time: 05/05/17  8:20 AM   Result Value Ref Range    EBV Capsid Antibody IgG  0.0 - 0.8 AI     <0.2  No detectable antibody.   Antibody index (AI) values reflect qualitative changes in antibody   concentration that cannot be directly associated with clinical condition or   disease state.     EBV Capsid Antibody IgM    Collection Time: 05/05/17  8:20 AM   Result Value Ref Range    EBV Capsid Antibody IgM 0.2 0.0 - 0.8 AI   M Tuberculosis by Quantiferon    Collection Time: 05/05/17  8:20 AM   Result Value Ref Range    M Tuberculosis Result Negative NEG    M Tuberculosis Antigen Value 0.00 IU/mL   Uric acid    Collection Time: 05/05/17  8:20 AM   Result Value Ref Range    Uric Acid 3.1 2.6 - 6.0 mg/dL   Routine UA with microscopic    Collection Time: 05/05/17  8:39 AM   Result Value Ref Range    Color Urine Straw     Appearance Urine Clear     Glucose Urine Negative NEG mg/dL    Bilirubin Urine Negative NEG    Ketones Urine Negative NEG mg/dL    Specific Gravity Urine 1.003 1.003 - 1.035    Blood Urine Negative NEG    pH Urine 7.0 5.0 - 7.0 pH    Protein Albumin Urine Negative NEG mg/dL    Urobilinogen mg/dL 0.0 0.0 - 2.0 mg/dL    Nitrite Urine Negative NEG    Leukocyte Esterase Urine Negative NEG    Source Midstream Urine     WBC Urine <1 0 - 2 /HPF    RBC Urine 1 0 - 2 /HPF    Bacteria Urine Few (A) NEG /HPF    Squamous Epithelial /HPF Urine <1 0 - 1 /HPF   Microalbumin quantitative random urine    Collection Time:  05/05/17  8:39 AM   Result Value Ref Range    Creatinine Urine 24 mg/dL    Albumin Urine mg/L <5 mg/L    Albumin Urine mg/g Cr Unable to calculate due to low value 0 - 25 mg/g Cr   Protein  random urine    Collection Time: 05/05/17  8:39 AM   Result Value Ref Range    Protein Random Urine <0.05 g/L    Protein Total Urine g/gr Creatinine Unable to calculate due to low value 0 - 0.2 g/g Cr   ABO type [[id number]]    Collection Time: 05/05/17  8:49 AM   Result Value Ref Range    ABO O     RH(D)  Pos     Specimen Expires 05/08/2017    EKG 12-lead complete w/read - Clinics    Collection Time: 05/05/17  9:31 AM   Result Value Ref Range    Interpretation ECG Click View Image link to view waveform and result    Iohexol    Collection Time: 05/05/17 11:14 AM   Result Value Ref Range    Iohexol t1 7.93 mg/dL    Iohexol t2 3.31 mg/dL    Iohexol Body Surface Area 1.893 m2    Iohexol Raw Clearance 104 mL/min    Iohexol Std Clearance 96 /1.73 m2     Exam: XR CHEST 2 VW, 5/5/2017 3:05 PM     Indication: donor eval, Encounter for examination of potential donor  of organ and tissue     Comparison: None available.     Findings:   PA and lateral view the chest. Cardiac silhouette and pulmonary  vasculature are within normal limits. No focal airspace opacity,  pleural effusion, or pneumothorax. Visualized upper abdomen is  unremarkable. No acute osseous abnormalities.         Impression: No acute cardiopulmonary process.     I have personally reviewed the examination and initial interpretation  and I agree with the findings.     BARBARA SHAH MD    Abdominal CT Angiography and 3-D Procession dated 5/5/2017 3:27 PM     Clinical History: Potential renal donor evaluation.     Comparisons: None     Technique: Helical scans were obtained through the abdomen before the  administration of intravenous contrast as well as in arterial, venous,  and delayed phases after contrast injection. Source images were  reviewed as well as 3D and multi-planar  reconstructions.     Contrast: 100 cc Isovue-370     Findings:      Right kidney:     1. Right kidney measures: 10.9 cm.  2. Renal arteries: 1 renal artery, length from ostium to first branch:  3.9 cm.  3. Renal veins: 1 renal vein  4. Ureters: 1 ureter  5. Renal parenchyma: No masses or stones  6. Renal volume: 158 cc     Left kidney:     1. Left kidney measures: 10.9 cm.  2. Renal arteries: 2 renal arteries. Craniocaudad distance between the  2 arteries: 2 mm.  -Superior larger artery: Length from ostium to first branch: 3.2 cm.  -Inferior smaller artery: Length from ostium to first branch: 5.4 cm.  3. Renal veins: 1 renal vein  4. Ureters: 1 ureter  5. Renal parenchyma: No masses or stones  6. Renal volume: 167 cc     The aorta and iliac arteries are normal in appearance. The origins of  the celiac artery, the superior mesenteric artery, and inferior  mesenteric artery are patent and the arteries are normal in  appearance.     The splenic vein, portal vein, hepatic veins, inferior vena cava, are  patent and normal in appearance.     Remainder of the abdomen and pelvis: The liver, gallbladder, adrenal  glands, spleen, pancreas, stomach, duodenum are without focal  abnormality.     No focal bowel wall thickening or obstruction. No free air or free  fluid. Gynecologic structures are within normal limits     Bones: No suspicious osseous lesions.         Impression:   1. The right kidney has one renal artery, one renal vein, one ureter.  Details as above.  2. The left kidney has 2 renal arteries, one renal vein, one ureter.  Details as above.     I have personally reviewed the examination and initial interpretation  and I agree with the findings.     KELLY DEAN            Again, thank you for allowing me to participate in the care of your patient.      Sincerely,    Silviano Lange MD

## 2017-05-05 NOTE — MR AVS SNAPSHOT
"              After Visit Summary   2017    Vanessa Larsen    MRN: [id number]           Patient Information     Date Of Birth          1990        Visit Information        Provider Department      2017 8:30 AM UC 46 ATC; UC SPEC INFUSION [facility] Specialty and Procedure        Today's Diagnoses     Transplant donor evaluation    -  1       Follow-ups after your visit        Who to contact     If you have questions or need follow up information about today's clinic visit or your schedule please contact [facility] SPECIALTY AND PROCEDURE directly at [phone].  Normal or non-critical lab and imaging results will be communicated to you by [employer], letter or phone within 4 business days after the clinic has received the results. If you do not hear from us within 7 days, please contact the clinic through [employer] or phone. If you have a critical or abnormal lab result, we will notify you by phone as soon as possible.  Submit refill requests through basno or call your pharmacy and they will forward the refill request to us. Please allow 3 business days for your refill to be completed.          Additional Information About Your Visit        MyChart Information     basno lets you send messages to your doctor, view your test results, renew your prescriptions, schedule appointments and more. To sign up, go to [email or web address] . Click on \"Log in\" on the left side of the screen, which will take you to the Welcome page. Then click on \"Sign up Now\" on the right side of the page.     You will be asked to enter the access code listed below, as well as some personal information. Please follow the directions to create your username and password.     Your access code is: [email or web address]  Expires: 2017  6:31 AM     Your access code will  in 90 days. If you need help or a new code, please call your [place] clinic or [phone].        Care EveryWhere ID "     This is your Care EveryWhere ID. This could be used by other organizations to access your [place] medical records  [id number]         Blood Pressure from Last 3 Encounters:   05/05/17 108/76   05/05/17 118/74    Weight from Last 3 Encounters:   05/05/17 74.9 kg (165 lb 1.6 oz)              We Performed the Following     Iohexol     Treatment Conditions     Treatment Conditions     Treatment Conditions     Treatment Conditions     Treatment Conditions        Primary Care Provider Office Phone # Fax #    Edelmira Nance [phone]       [facility] [address] [facility] [address]        Thank you!     Thank you for choosing [facility] SPECIALTY AND PROCEDURE  for your care. Our goal is always to provide you with excellent care. Hearing back from our patients is one way we can continue to improve our services. Please take a few minutes to complete the written survey that you may receive in the mail after your visit with us. Thank you!             Your Updated Medication List - Protect others around you: Learn how to safely use, store and throw away your medicines at www.disposemymeds.org.      Notice  As of 5/5/2017  2:30 PM    You have not been prescribed any medications.

## 2017-05-05 NOTE — MR AVS SNAPSHOT
"              After Visit Summary   2017    Vanessa Larsen    MRN: [id number]           Patient Information     Date Of Birth          1990        Visit Information        Provider Department      2017 11:00 AM Silviano Lange MD [facility] Solid Organ Transplant        Today's Diagnoses     Transplant donor evaluation           Follow-ups after your visit        Who to contact     If you have questions or need follow up information about today's clinic visit or your schedule please contact [facility] SOLID ORGAN TRANSPLANT directly at [phone].  Normal or non-critical lab and imaging results will be communicated to you by MyChart, letter or phone within 4 business days after the clinic has received the results. If you do not hear from us within 7 days, please contact the clinic through [employer] or phone. If you have a critical or abnormal lab result, we will notify you by phone as soon as possible.  Submit refill requests through [employer] or call your pharmacy and they will forward the refill request to us. Please allow 3 business days for your refill to be completed.          Additional Information About Your Visit        MyChart Information     [employer] lets you send messages to your doctor, view your test results, renew your prescriptions, schedule appointments and more. To sign up, go to [email or web address] . Click on \"Log in\" on the left side of the screen, which will take you to the Welcome page. Then click on \"Sign up Now\" on the right side of the page.     You will be asked to enter the access code listed below, as well as some personal information. Please follow the directions to create your username and password.     Your access code is: [email or web address]  Expires: 2017  6:31 AM     Your access code will  in 90 days. If you need help or a new code, please call your [place] clinic or [phone].        Care EveryWhere ID     This is your Care EveryWhere ID. This could be used by other " organizations to access your [place] medical records  [id number]         Blood Pressure from Last 3 Encounters:   05/05/17 108/72   05/05/17 108/76   05/05/17 118/74    Weight from Last 3 Encounters:   05/05/17 74.9 kg (165 lb 1.6 oz)              We Performed the Following     GENERAL SURG ADULT REFERRAL        Primary Care Provider Office Phone # Fax #    Edelmira FLOYD Nance [phone]       [address] [address]        Thank you!     Thank you for choosing [facility] SOLID ORGAN TRANSPLANT  for your care. Our goal is always to provide you with excellent care. Hearing back from our patients is one way we can continue to improve our services. Please take a few minutes to complete the written survey that you may receive in the mail after your visit with us. Thank you!             Your Updated Medication List - Protect others around you: Learn how to safely use, store and throw away your medicines at www.disposemymeds.org.      Notice  As of 5/5/2017 11:59 PM    You have not been prescribed any medications.

## 2017-05-05 NOTE — PROGRESS NOTES
NUTRITION KIDNEY DONOR EVALUATION  Medical Nutrition Therapy    Weight and BMI:  Current BMI: 25.9  BMI is within criteria for kidney donation    8 Year Risk of Diabetes:  </= 3%       Visit Type: Initial Assessment    Vanessa Larsen referred by Dr. Lange for MNT related to potential kidney donor evaluation    Patient accompanied by her mom, Maria A     Nutrition Assessment:  Anthropometrics  Height:   67  in   BMI:    25.9    Weight Status:Overweight BMI 25-29.9   Weight:  165 lbs            IBW (lbs): 135  % IBW: 123    Wt Hx: No major changes     Adj/dosing BW: 143 lbs/65 kg        Recent Labs   Lab Test  05/05/17   0820   CHOL  164   HDL  82   LDL  76   TRIG  31       BG = 83  A1C = 5.1      Prediction of Incident Diabetes Mellitus in Middle-aged Adults: The [place] Offspring Study  Charles Ramirez MD; James B. Meigs, MD, MPH; Florinda Guallpa, PhD; Madonna Morejon MD, MPH; Preston Weaver MD; Moisés Medley Sr,   PhD  Pt's estimated risk for T2DM (per Table 6 above)  Pt received points for the following criteria: BMI>25  Total points: 2  8-Year risk of T2DM: </= 3%    Nutrition History  Pt follows a regular diet at home.  Dining out/food not made at home: 1x/week - Chinese   Vitamins, Supplements, Herbals, Pertinent Meds: vit A as of 2 days ago to prevent sunburn per mom's recommendation    Recall:  B: coffee and chocolate   L: cereal with whole milk   D: chix soup, chix stir cardona, rice and chix  Sn: chocolate   Beverages: blue Gatorade, coffee   ETOH (1 drink = 12 oz beer, 5 oz wine, 1.5 oz liquor): none     Physical Activity  Rollerblading in the summer; walks if she doesn't reach her [employer] step goal      Nutrition Prescription  Estimated Energy Needs: 9722-6077 kcals (25-30 Kcal/Kg)  Justification: maintenance    Estimated Protein Needs: 52-65 protein (0.8-1 g pro/Kg)  Justification: maintenance    Estimated Fluid Needs:  (1 mL/Kcal)  Justification: maintenance     Fiber: 25-30 g/day      Nutrition Diagnosis:  Food and nutrition related knowledge deficit r/t pre transplant donor eval pt AEB pt verbalized not hearing pre/post transplant diet guidelines.    Nutrition Intervention:  Nutrition education provided:  Reviewed overall healthy diet guidelines for pre and post kidney donation. Discussed maintenance of a healthy weight, Na+ intake <3000 mg/day, and avoidance of herbal supplements post donation d/t unknown effects on the kidney.     Patient Understanding: Pt verbalized understanding of education provided.  Expected Compliance: Good  Follow-Up Plans: PRN     Nutrition Goals:  Na+ <3000 mg/day    Provided pt with contact info.    Time spent with patient: 15 minutes.  Etta White, RD, LD

## 2017-05-05 NOTE — MR AVS SNAPSHOT
"              After Visit Summary   2017    Vanessa Larsen    MRN: [id number]           Patient Information     Date Of Birth          1990        Visit Information        Provider Department      2017 10:00 AM Edelmira Zimmerman LICSW [facility] Solid Organ Transplant        Today's Diagnoses     Transplant donor evaluation           Follow-ups after your visit        Who to contact     If you have questions or need follow up information about today's clinic visit or your schedule please contact [facility] SOLID ORGAN TRANSPLANT directly at [phone].  Normal or non-critical lab and imaging results will be communicated to you by [employer], letter or phone within 4 business days after the clinic has received the results. If you do not hear from us within 7 days, please contact the clinic through [employer] or phone. If you have a critical or abnormal lab result, we will notify you by phone as soon as possible.  Submit refill requests through [employer] or call your pharmacy and they will forward the refill request to us. Please allow 3 business days for your refill to be completed.          Additional Information About Your Visit        MyChart Information     [employer] lets you send messages to your doctor, view your test results, renew your prescriptions, schedule appointments and more. To sign up, go to [email or web address] . Click on \"Log in\" on the left side of the screen, which will take you to the Welcome page. Then click on \"Sign up Now\" on the right side of the page.     You will be asked to enter the access code listed below, as well as some personal information. Please follow the directions to create your username and password.     Your access code is: [email or web address]  Expires: 2017  6:31 AM     Your access code will  in 90 days. If you need help or a new code, please call your [place] clinic or [phone].        Care EveryWhere ID     This is your Care EveryWhere ID. This could be " used by other organizations to access your [facility] medical records  [id number]         Blood Pressure from Last 3 Encounters:   05/05/17 108/72   05/05/17 108/76   05/05/17 118/74    Weight from Last 3 Encounters:   05/05/17 74.9 kg (165 lb 1.6 oz)              We Performed the Following      REFERRAL        Primary Care Provider Office Phone # Fax #    Edelmira Nance [phone]       [address] [address]        Thank you!     Thank you for choosing [facility] SOLID ORGAN TRANSPLANT  for your care. Our goal is always to provide you with excellent care. Hearing back from our patients is one way we can continue to improve our services. Please take a few minutes to complete the written survey that you may receive in the mail after your visit with us. Thank you!             Your Updated Medication List - Protect others around you: Learn how to safely use, store and throw away your medicines at www.disposemymeds.org.      Notice  As of 5/5/2017 11:59 PM    You have not been prescribed any medications.

## 2017-05-08 LAB
INTERPRETATION ECG - MUSE: NORMAL
M TB TUBERC IFN-G BLD QL: NEGATIVE
M TB TUBERC IFN-G/MITOGEN IGNF BLD: 0 IU/ML

## 2017-05-09 LAB
BSA: 1.89 M2
IOHEXOL CL UR+SERPL-VRATE: 104 ML/MIN
IOHEXOL CL UR+SERPL-VRATE: 3.31 MG/DL
IOHEXOL CL UR+SERPL-VRATE: 7.93 MG/DL
IOHEXOL CL UR+SERPL-VRATE: 96 /1.73 M2

## 2017-05-10 ENCOUNTER — COMMITTEE REVIEW (OUTPATIENT)
Dept: TRANSPLANT | Facility: CLINIC | Age: 27
End: 2017-05-10

## 2017-05-10 NOTE — COMMITTEE REVIEW
Abdominal Committee Review Note     Evaluation Date: 5/5/2017  Committee Review Date: 5/10/2017    Organ being evaluated for: Kidney    Transplant Phase:   Transplant Status:     Transplant Coordinator: Brenda  Transplant Surgeon:       Referring Physician: Referred Self    Primary Diagnosis:z00.5   Secondary Diagnosis:     Committee Review Members:  Nephrology Sunil Meade MD   Nutrition Etta White,    Pharmacy Hayes Long, [facility]    - Clinical Alondra Leblanc, [facility], Edelmira Zimmerman, [facility]   Transplant Eden Acharya, James Ivey MD, Sherley Esquivel, BRADY, Cecy Royal, FLOYD, Erika Gomez LPN, Mary Gutierrez, BRADY, Dane Griffiths MD       Transplant Eligibility: Acceptable Physical Health, Acceptable Mental Health    Committee Review Decision: Approved    Relative Contraindications: None    Absolute Contraindications: None    Committee Chair James Ivey MD verbally attested to the committee's decision.    Committee Discussion Details: Patient is approved.  CT viewed choice kidney.  The Donor selection team recommended information if the patient has had HPV vaccine.

## 2017-05-12 ENCOUNTER — TELEPHONE (OUTPATIENT)
Dept: TRANSPLANT | Facility: CLINIC | Age: 27
End: 2017-05-12

## 2017-05-12 NOTE — TELEPHONE ENCOUNTER
I reviewed with Maria A that the Evaluation tests completed were normal.  I reviewed that the donor team would like to confirm that the patient had had HPV vaccine.  Maria A stated yes, Vanessa had the series as with her history of cervical cancer she made sure that all her children had the vaccination.  I reviewed that our team needs to discuss this issue again at the next donor meeting as there was some concern by some members that she should have a pap.  Maria A stated understanding and further explained that they would be very disappointed if that is going to be necessary for donor approval since they were told prior to Vanessa coming to evaluation that it would not be necessary since she is not sexually active.   I will convey the concern of the patient at our next meeting and update Vanessa next week.

## 2017-05-17 ENCOUNTER — TELEPHONE (OUTPATIENT)
Dept: TRANSPLANT | Facility: CLINIC | Age: 27
End: 2017-05-17

## 2017-05-17 ENCOUNTER — COMMITTEE REVIEW (OUTPATIENT)
Dept: TRANSPLANT | Facility: CLINIC | Age: 27
End: 2017-05-17

## 2017-05-17 NOTE — TELEPHONE ENCOUNTER
I called the patient to let her know that she is approved.  I asked her to send back her signed [employer] consent forms that she took home with her.  Also she will send me a copy of her vaccination record.  She asked about the health care reform that is being discussed at the [place] [employer], she stated concern about ramifications if she is a donor.  I reviewed that in the far past there were occasional donors that had problems with insurance due to being a kidney donor and having a preexhisting condition.  I stated that has gone away with the affordable care act legislation.  I stated that if that clause is taken away there is no way that we can predict if she would have any issues or not.  She stated that she would prefer to donate in the Summer.    I reviewed that once I receive the [employer] consents I can begin their listing.

## 2017-05-17 NOTE — COMMITTEE REVIEW
Abdominal Committee Review Note     Evaluation Date:   Committee Review Date: 5/17/2017    Organ being evaluated for: Kidney    Transplant Phase:   Transplant Status:     Transplant Coordinator: No matching coordinators  Transplant Surgeon:       Referring Physician: Referred Self    Primary Diagnosis:   Secondary Diagnosis:     Committee Review Members:  Nephrology Sunil Meade MD   Nutrition Etta White, RD   Pharmacy Hayes Long, [facility]    - Clinical Alondra Leblanc, [facility], Edelmira Zimmerman, [facility]   Transplant Jordana Cristina MD, Sherley Shankar, BRADY, Cecy Royal, NP, Erika Gomez LPN, Mary Gutierrez, BRADY, Silviano Lange MD       Transplant Eligibility: Acceptable Mental Health, Acceptable Physical Health    Committee Review Decision: Approved    Relative Contraindications: None    Absolute Contraindications: None    Committee Chair Silviano Lange MD verbally attested to the committee's decision.    Committee Discussion Details: Pt is approved.  Discussed patient history of HPV vaccine.

## 2017-07-07 ENCOUNTER — TELEPHONE (OUTPATIENT)
Dept: TRANSPLANT | Facility: CLINIC | Age: 27
End: 2017-07-07

## 2017-07-07 NOTE — TELEPHONE ENCOUNTER
I reviewed that we can be active now in [facility].  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  She will go to [facility] for the cryo draw.  We will touchbase after she is listed to see how things are going.

## 2017-07-11 ENCOUNTER — TELEPHONE (OUTPATIENT)
Dept: TRANSPLANT | Facility: CLINIC | Age: 27
End: 2017-07-11

## 2017-07-11 NOTE — TELEPHONE ENCOUNTER
I received a call from the patient that she received her kit for the cryo shipment.  I reviewed the process for the draw, I faxed order to [facility] lab, confirming they have Fed Ex  at 14:00.  She is going to go tomorrow or Thursday. Her Dad is having surgery on Friday she said.

## 2017-07-25 ENCOUNTER — DOCUMENTATION ONLY (OUTPATIENT)
Dept: PHARMACY | Facility: CLINIC | Age: 27
End: 2017-07-25

## 2017-07-25 NOTE — PHARMACY-MEDICATION REGIMEN REVIEW
Pharmacy Living Kidney Donor Medication Evaluation     This patient is a 27 year old female being considered for living kidney donation. As part of the kidney pre-donation patient evaluation, pharmacy has screened this patient's electronic medical record for medication related concerns.    Assessment / Plan    No significant potential medication related issues are expected for this patient post surgery, based on the medical record medication list review.  Pharmacy will continue to participate in this patient's care throughout the surgery course.  Please contact pharmacy with any further medication related questions or concerns.       JAISON Antony.Ph.  [facility]  [phone] pager

## 2017-08-23 ENCOUNTER — TELEPHONE (OUTPATIENT)
Dept: TRANSPLANT | Facility: CLINIC | Age: 27
End: 2017-08-23

## 2017-09-05 NOTE — TELEPHONE ENCOUNTER
Vanessa returned my message.  I reviewed the situation with her intended recipient having been transplanted this week.  I stated this is very exciting, however there is an issue with the other donor going forward.  I told her that I am calling to check on her availability/willingness to proceed with donation.  She stated that she had heard about Julia's transplant.  She stated that she is willing to continue to be a donor, however due to strict guidelines at her employment for leave from work, she is not going to be available now until next summer.  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  She stated that she is unable to be gone for a long leave due to this program.    I thanked her for the explanation and stated support that we would not want her to do anything to make her financially vulnerable.    I told her that I would like to give her periodic updates on her group as we look to see who can donate on behalf of Julia to the [facility] program.  She agrees, she would like to get updates on things since it will be a long holding period.  I also answered her question that her labs will need to be updated after one year of waiting after her donor evaluation.

## 2018-01-07 ENCOUNTER — HEALTH MAINTENANCE LETTER (OUTPATIENT)
Age: 28
End: 2018-01-07

## 2018-04-26 ENCOUNTER — TELEPHONE (OUTPATIENT)
Dept: TRANSPLANT | Facility: CLINIC | Age: 28
End: 2018-04-26

## 2018-04-26 NOTE — TELEPHONE ENCOUNTER
I was asked by Mary Gutierrez, RN, to call and talk to Vanessa about her desire to go forward with living kidney donation.  Her mom, Maria A, answered the phone.  I spoke to Maria A (We have an MANJINDER to share information with Maria A.)  She states that Vanessa does not want to go forward with kidney donation citing financial and timing issues.  She tells me that Vanessa has been dreading calling us or getting our call because she feels bad about saying no to being a donor.  Maria A states that Vanessa has been feeling pressured by us even calling her to find out about whether or not she still wants to be a donor.  She states that she does not have any paid time off and that she is in a special program for student loan forgiveness.  Since she is a  in a very poor school district in [place], she can get 100% of her student loans forgiven if she works for 10 years.  I thanked Amria A for letting us know.  I said that it was okay for Vanessa not to call us or e-mail us, since she has been feeling pressured by our communication with her about donation.  Please close out her donor file.    BARBER Espino, [employer]  Clinical  and Independent Donor Advocate  [facility]  Pager:  [phone]  Direct:  [phone]

## 2020-03-10 ENCOUNTER — HEALTH MAINTENANCE LETTER (OUTPATIENT)
Age: 30
End: 2020-03-10

## 2020-12-27 ENCOUNTER — HEALTH MAINTENANCE LETTER (OUTPATIENT)
Age: 30
End: 2020-12-27

## 2021-04-24 ENCOUNTER — HEALTH MAINTENANCE LETTER (OUTPATIENT)
Age: 31
End: 2021-04-24

## 2021-10-09 ENCOUNTER — HEALTH MAINTENANCE LETTER (OUTPATIENT)
Age: 31
End: 2021-10-09

## 2022-05-16 ENCOUNTER — HEALTH MAINTENANCE LETTER (OUTPATIENT)
Age: 32
End: 2022-05-16

## 2022-09-11 ENCOUNTER — HEALTH MAINTENANCE LETTER (OUTPATIENT)
Age: 32
End: 2022-09-11

## 2023-06-03 ENCOUNTER — HEALTH MAINTENANCE LETTER (OUTPATIENT)
Age: 33
End: 2023-06-03